# Patient Record
Sex: FEMALE | Race: BLACK OR AFRICAN AMERICAN | NOT HISPANIC OR LATINO | Employment: OTHER | URBAN - METROPOLITAN AREA
[De-identification: names, ages, dates, MRNs, and addresses within clinical notes are randomized per-mention and may not be internally consistent; named-entity substitution may affect disease eponyms.]

---

## 2018-07-15 ENCOUNTER — HOSPITAL ENCOUNTER (EMERGENCY)
Facility: HOSPITAL | Age: 73
Discharge: HOME | End: 2018-07-15
Attending: FAMILY MEDICINE
Payer: MEDICARE

## 2018-07-15 VITALS
DIASTOLIC BLOOD PRESSURE: 62 MMHG | BODY MASS INDEX: 28.32 KG/M2 | WEIGHT: 170 LBS | OXYGEN SATURATION: 92 % | RESPIRATION RATE: 18 BRPM | HEIGHT: 65 IN | SYSTOLIC BLOOD PRESSURE: 114 MMHG | HEART RATE: 68 BPM | TEMPERATURE: 99 F

## 2018-07-15 DIAGNOSIS — G50.0 TRIGEMINAL NEURALGIA OF RIGHT SIDE OF FACE: Primary | ICD-10-CM

## 2018-07-15 PROCEDURE — 99283 EMERGENCY DEPT VISIT LOW MDM: CPT | Mod: ,,, | Performed by: FAMILY MEDICINE

## 2018-07-15 PROCEDURE — 99283 EMERGENCY DEPT VISIT LOW MDM: CPT

## 2018-07-15 RX ORDER — CARBAMAZEPINE 200 MG/1
200 TABLET, EXTENDED RELEASE ORAL 2 TIMES DAILY
Qty: 60 TABLET | Refills: 0 | Status: SHIPPED | OUTPATIENT
Start: 2018-07-15 | End: 2018-08-07 | Stop reason: SDUPTHER

## 2018-07-15 NOTE — DISCHARGE INSTRUCTIONS
You should take the carbamazepine twice daily.  It will take a few weeks to start helping.  It may make you feel sleepy, but this side effect usually gets better as your body adjusts to the medicine.      If the medicine helps control your pain, you must continue to take it or the pain will usually return when you stop it.      You need to follow up with a primary doctor to monitor you on this medicine and make adjustments in the dose to find the level which helps the pain enough without causing too much drowsiness.      We are giving you a one month supply to get started.  You should follow up with a clinic doctor before this runs out in order to decide how much and how long to continue.

## 2018-07-15 NOTE — ED NOTES
Pt arrives for evaluation of right facial pain since September of 2017 - states she went to [facility] ED and was told to follow up with a dentist - states she has not seen a dentist and pain is getting worse - denies trauma - worse when eating or chewing - pain concentrated to right mandible

## 2018-07-15 NOTE — ED PROVIDER NOTES
Encounter Date: 7/15/2018    SCRIBE #1 NOTE: IRoxie am scribing for, and in the presence of,  Dr. Mckee . I have scribed the following portions of the note - Other sections scribed: HPI, ROS, PE.       History     Chief Complaint   Patient presents with    Facial Pain     facial pain on rt side of face from corner of mouth to rt ear - onset September 2017. [facility] but wasn;t able to make appointment.      Time patient was seen by the provider: 9:56 AM      The patient is a 72 Y.O. female with co-morbidities including: HTN who presents to the ED with a complaint of facial pain. The patient reports chronic, intermittent, right-sided, facial pain from the corner of the mouth to her ear. She reports that some days are worse than others in regards to her pain. The patient states the pain has been worsening since 3AM this morning. She reports being seen in the ER and a dentist for sx in the past. Patient states that the dentist numbed her mouth and cleaned her teeth. She denies any bleeding and broken teeth.       The history is provided by the patient and medical records.     Review of patient's allergies indicates:  No Known Allergies  Past Medical History:   Diagnosis Date    Hypertension      Past Surgical History:   Procedure Laterality Date    HYSTERECTOMY  1977     No family history on file.  Social History   Substance Use Topics    Smoking status: Never Smoker    Smokeless tobacco: Not on file    Alcohol use No     Review of Systems   Constitutional: Negative for fever.   HENT: Negative for sore throat.         Right-sided, chronic, intermittent, facial pain from the mouth to the ear.    Respiratory: Negative for shortness of breath.    Cardiovascular: Negative for chest pain.   Gastrointestinal: Negative for nausea.   Genitourinary: Negative for dysuria.   Musculoskeletal: Negative for back pain.   Skin: Negative for rash.   Neurological: Negative for weakness.    Hematological: Does not bruise/bleed easily.       Physical Exam     Initial Vitals [07/15/18 0840]   BP Pulse Resp Temp SpO2   114/62 68 18 98.8 °F (37.1 °C) (!) 92 %      MAP       --         Physical Exam    Nursing note and vitals reviewed.  Constitutional: She appears well-developed and well-nourished. She is not diaphoretic. No distress.   HENT:   Head: Normocephalic and atraumatic.   Pain reproduced by light touch along the right mandible and tapping on the right lower pre moral area. No bleeding or evidence of physical abnormalities.      Eyes: EOM are normal. Pupils are equal, round, and reactive to light.   Neck: Normal range of motion. Neck supple.   Cardiovascular: Normal rate, regular rhythm, normal heart sounds and intact distal pulses. Exam reveals no friction rub.    No murmur heard.  Pulmonary/Chest: Breath sounds normal. No respiratory distress.   Abdominal: Soft. Bowel sounds are normal. She exhibits no distension. There is no tenderness. There is no rebound.   Musculoskeletal: Normal range of motion. She exhibits no edema or tenderness.   Neurological: She is alert and oriented to person, place, and time. She has normal strength. No cranial nerve deficit.   Skin: Skin is warm and dry. No erythema. No pallor.         ED Course   Procedures  Labs Reviewed - No data to display       Imaging Results    None          Medical Decision Making:   ED Management:  Patient had an episode of paresthesia and pain during her physical exam this afternoon.  She has severe lancinating pain for several seconds with no fixed neurologic deficits during or post exam.    After the episode she was essentially asymptomatic again    Her presentation is classic for trigeminal neuralgia.  I do not think she has a fixed dental lesions such as an abscess or dry socket causing this pain given the rapid onset and resolution I observed  Today.    She is reassured that the symptoms are not suggestive of stroke or other fixed  neurologic deficit.  Think it is reasonable to start her on a trial of carbamazepine.  I explained in detail that this needs to be followed up for dosage adjustment and to confirm that she does respond to the medicine.  However I do not think she needs additional emergency diagnostic evaluation or therapeutic intervention today.    Stable for discharge            Scribe Attestation:   Scribe #1: I performed the above scribed service and the documentation accurately describes the services I performed. I attest to the accuracy of the note.               Clinical Impression:   The encounter diagnosis was Trigeminal neuralgia of right side of face.                             David Mckee MD  07/15/18 9420

## 2018-07-26 ENCOUNTER — OFFICE VISIT (OUTPATIENT)
Dept: INTERNAL MEDICINE | Facility: CLINIC | Age: 73
End: 2018-07-26
Payer: MEDICARE

## 2018-07-26 VITALS
BODY MASS INDEX: 28.36 KG/M2 | HEART RATE: 64 BPM | WEIGHT: 170.19 LBS | HEIGHT: 65 IN | SYSTOLIC BLOOD PRESSURE: 141 MMHG | DIASTOLIC BLOOD PRESSURE: 81 MMHG

## 2018-07-26 DIAGNOSIS — Z09 HOSPITAL DISCHARGE FOLLOW-UP: Primary | ICD-10-CM

## 2018-07-26 DIAGNOSIS — G50.0 TRIGEMINAL NEURALGIA OF RIGHT SIDE OF FACE: ICD-10-CM

## 2018-07-26 PROBLEM — E78.5 HLD (HYPERLIPIDEMIA): Status: ACTIVE | Noted: 2018-07-26

## 2018-07-26 PROBLEM — I10 HTN (HYPERTENSION): Status: ACTIVE | Noted: 2018-07-26

## 2018-07-26 PROCEDURE — 99999 PR PBB SHADOW E&M-EST. PATIENT-LVL III: CPT | Mod: PBBFAC,GC,, | Performed by: STUDENT IN AN ORGANIZED HEALTH CARE EDUCATION/TRAINING PROGRAM

## 2018-07-26 PROCEDURE — 99203 OFFICE O/P NEW LOW 30 MIN: CPT | Mod: S$PBB,GC,, | Performed by: STUDENT IN AN ORGANIZED HEALTH CARE EDUCATION/TRAINING PROGRAM

## 2018-07-26 PROCEDURE — 99213 OFFICE O/P EST LOW 20 MIN: CPT | Mod: PBBFAC | Performed by: STUDENT IN AN ORGANIZED HEALTH CARE EDUCATION/TRAINING PROGRAM

## 2018-07-26 NOTE — PROGRESS NOTES
"Subjective:       Patient ID: Kel Cui is a 72 y.o. female.    Chief Complaint: Follow-up    Ms. Kel Cui, 73 yo female w/PMH of HTN, HLD, cataracts, depression, and trigeminal neuralgia, presents for ER f/u. She presented to the ED on 7/15 with a complaint of right sided facial pain. The patient reports chronic, daily, intermittent, right-sided, facial pain from the corner of the mouth to her ear. The pain is associated with right blurry vision and difficulty chewing with pain episodes. Pain is sharp and 10/10 at worst. Pain has been ongoing since summer 2017. She reports that some days are worse than others in regards to her pain. The patient states the pain has been worsening since 3AM 7/15 morning so she went to the ED. She had seen an [facility] PCP last summer and was prescribed carbamazepine 100 mg daily. She tried it for 1 month with no relief and stopped it. She also reports seeing a dentist for sx in the past. Patient states that the dentist numbed her mouth and cleaned her teeth but nothing else. She states she saw another dentist in [place] that told her to see an orthodontist for possible "pulp stone" but she never followed up with that. She denies any bleeding and broken teeth. This past ED visit, she was given carbamazepine 200 mg BID and discharged home to f/u with a PCP. Today, she states the pain is still there and the medicine has not helped. She states she has never seen a neurologist for this. Pt would like to establish care another time with a staff or resident that can see her in the AMs because she as a 10 yo nephew that she has to be home for in the afternoons.      Review of Systems   Constitutional: Negative for chills, diaphoresis, fatigue and fever.   HENT: Negative for congestion, hearing loss, rhinorrhea, sore throat and trouble swallowing.         R sided facial pain, intermittent   Eyes: Positive for visual disturbance (R blurry vision with pain). Negative for pain. " "  Respiratory: Negative for cough and shortness of breath.    Cardiovascular: Negative for chest pain, palpitations and leg swelling.   Gastrointestinal: Negative for abdominal pain, nausea and vomiting.   Genitourinary: Negative for dysuria and hematuria.   Musculoskeletal: Negative for arthralgias and back pain.   Skin: Negative for color change, rash and wound.   Neurological: Negative for dizziness, syncope, speech difficulty, weakness, light-headedness, numbness and headaches.   Hematological: Negative for adenopathy. Does not bruise/bleed easily.   Psychiatric/Behavioral: Negative for agitation and confusion. The patient is not nervous/anxious.        Objective:       BP (!) 141/81 (BP Location: Left arm, Patient Position: Sitting, BP Method: Medium (Automatic))   Pulse 64   Ht 5' 5" (1.651 m)   Wt 77.2 kg (170 lb 3.1 oz)   BMI 28.32 kg/m²     Physical Exam   Constitutional: She is oriented to person, place, and time. She appears well-developed and well-nourished. No distress.   HENT:   Head: Normocephalic and atraumatic.   Right Ear: External ear normal.   Left Ear: External ear normal.   Mouth/Throat: Oropharynx is clear and moist. No oropharyngeal exudate.   Mild amt of cerumen b/l. No sensation loss for face b/l. No TTP. Tapping on face does not re-create pain.   Eyes: Conjunctivae and EOM are normal. Pupils are equal, round, and reactive to light. Right eye exhibits no discharge. Left eye exhibits no discharge. No scleral icterus.   Neck: Normal range of motion. Neck supple. No thyromegaly present.   Cardiovascular: Normal rate, regular rhythm and intact distal pulses.    No murmur heard.  Pulmonary/Chest: Effort normal and breath sounds normal. No respiratory distress. She has no wheezes. She has no rales. She exhibits no tenderness.   Abdominal: Soft. Bowel sounds are normal. She exhibits no mass. There is no tenderness. There is no guarding.   Musculoskeletal: Normal range of motion. She exhibits " "no edema or tenderness.   Neurological: She is alert and oriented to person, place, and time.   Skin: Skin is warm and dry. No rash noted. She is not diaphoretic. No erythema.   Psychiatric: She has a normal mood and affect. Her behavior is normal. Judgment and thought content normal.   Vitals reviewed.      Assessment:       1. Hospital discharge follow-up    2. Trigeminal neuralgia of right side of face        Plan:       Ms. Kel Cui, 71 yo female w/PMH of HTN, HLD, cataracts, depression, and trigeminal neuralgia, presents for ER f/u for right side facial pain that was diagnosed as trigeminal neuralgia.    1. Hospital discharge follow-up  - see HPI, possible 2/2 tooth etiology/"pulp stone" as pt stated and was told to f/u with an orthodontist or oral surgeon.   - Gave names/phone number for 2 oral surgeons in [place]: Dr. Julio Dykes or Dr. Rodolfo Garcia, oral surgeons. May have "pulp stone"  Office number: [phone]    2. Trigeminal neuralgia of right side of face  - Ambulatory Referral to Neurology  - cont carbamazepine 200 mg BID as prescribed by ED until appt with neuro      Discussed with Dr. Razo. Pt to establish care sometime next week or as soon as can be scheduled with an AM resident.    Ailyn Warren, PGY3  Internal Medicine  "

## 2018-07-26 NOTE — PATIENT INSTRUCTIONS
"Dr. Julio Dykes or Dr. Rodolfo Garcia, oral surgeons. May have "pulp stone"  Office number: [phone]  "

## 2018-08-07 ENCOUNTER — OFFICE VISIT (OUTPATIENT)
Dept: INTERNAL MEDICINE | Facility: CLINIC | Age: 73
End: 2018-08-07
Payer: MEDICARE

## 2018-08-07 VITALS
BODY MASS INDEX: 28.1 KG/M2 | SYSTOLIC BLOOD PRESSURE: 142 MMHG | OXYGEN SATURATION: 99 % | WEIGHT: 168.63 LBS | HEART RATE: 74 BPM | DIASTOLIC BLOOD PRESSURE: 74 MMHG | HEIGHT: 65 IN

## 2018-08-07 DIAGNOSIS — G50.0 TRIGEMINAL NEURALGIA OF RIGHT SIDE OF FACE: Primary | ICD-10-CM

## 2018-08-07 PROCEDURE — 99214 OFFICE O/P EST MOD 30 MIN: CPT | Mod: PBBFAC

## 2018-08-07 PROCEDURE — 99999 PR PBB SHADOW E&M-EST. PATIENT-LVL IV: CPT | Mod: PBBFAC,GC,,

## 2018-08-07 PROCEDURE — 99213 OFFICE O/P EST LOW 20 MIN: CPT | Mod: S$PBB,GC,,

## 2018-08-07 RX ORDER — CARBAMAZEPINE 200 MG/1
200 TABLET, EXTENDED RELEASE ORAL 3 TIMES DAILY
Qty: 90 TABLET | Refills: 0 | Status: SHIPPED | OUTPATIENT
Start: 2018-08-07 | End: 2018-08-30

## 2018-08-07 NOTE — PROGRESS NOTES
Subjective:       Patient ID: Kel Cui is a 72 y.o. female.    Chief Complaint: Hospital Follow Up    HPI     Kel Cui is a 72 y.o. lady with trigeminal neuralgia, essential HTN, HLD who presents as a follow up.  She was originally seen in the ED on 7/15 for R sided facial pain that has been chronic for the past year.  She was given a prescription of carbamazepine 200 mg PO BID without significant relief.  She followed up with [facility] for further management, where she was given reference numbers to a dentist.  She states she was unable to call them as they work in [employer] and she lives in [place] and takes public transportation to get here.  She does note however, that her pain has improved not in intensity, but in frequency.  She has noted she had increased her tegretol to 200 mg PO TID.  Otherwise, she has no fevers, chills, or oral issues.  Still requests to see a provider in the AM as she has 10 year old child to care for and  from school.    Based on her previous information, she notes she did have [facility] which [facility] 5 polyps and was informed she needed a 1 year follow up colonoscopy.  She also notes of having a mammogram 1 year ago and was informed of having a follow up.  She does have a family history of cancer of unknown etiology with her mother, who she states passed at the age of 39.  Did have pneumovax after 2010 and had flu vaccine within the past year.  Denies any significant history of colon cancer or early heart disease.  No labs at baseline on our records.  Was previously a housekeepr at a hotel.        Review of Systems   Constitutional: Negative for chills, fatigue and fever.   HENT: Negative for dental problem, drooling, ear pain, facial swelling, hearing loss, mouth sores, postnasal drip and rhinorrhea.    Eyes: Negative for photophobia and visual disturbance.   Respiratory: Negative for cough, shortness of breath and wheezing.   "  Cardiovascular: Negative for chest pain, palpitations and leg swelling.   Gastrointestinal: Negative for abdominal distention, abdominal pain, constipation, diarrhea, nausea and vomiting.   Endocrine: Negative for polydipsia and polyphagia.   Genitourinary: Negative for dysuria and frequency.   Musculoskeletal: Negative for arthralgias, back pain and myalgias.   Skin: Negative for pallor and rash.   Neurological: Negative for tremors, weakness, light-headedness and headaches.   Psychiatric/Behavioral: Negative for behavioral problems and confusion.       Objective:       Vitals:    08/07/18 1410   BP: (!) 142/74   BP Location: Right arm   Patient Position: Sitting   BP Method: Large (Manual)   Pulse: 74   SpO2: 99%   Weight: 76.5 kg (168 lb 10.4 oz)   Height: 5' 5" (1.651 m)       Physical Exam   Constitutional: She is oriented to person, place, and time. She appears well-developed and well-nourished. No distress.   HENT:   Head: Normocephalic and atraumatic.   Right Ear: External ear normal.   Left Ear: External ear normal.   Mouth/Throat: No oropharyngeal exudate.   Point tenderness palpated on R side of chin, no other obvious pain along mandible.  Overall, noted 4 episodes where patient noted pain, of which lasted approximately 4-5 seconds each.  No oral lesions noted.  No abscess noted.  No cervical adenopathy.     Neck: Normal range of motion. No thyromegaly present.   Cardiovascular: Normal rate, regular rhythm and normal heart sounds.    No murmur heard.  Pulmonary/Chest: Effort normal and breath sounds normal. No respiratory distress. She has no wheezes.   Abdominal: Soft. Bowel sounds are normal. She exhibits no distension. There is no tenderness.   Musculoskeletal: Normal range of motion. She exhibits no edema.   Lymphadenopathy:     She has no cervical adenopathy.   Neurological: She is alert and oriented to person, place, and time. No cranial nerve deficit.   Skin: Skin is warm and dry. She is not " diaphoretic. No erythema. No pallor.   Psychiatric: She has a normal mood and affect. Her behavior is normal.   Vitals reviewed.      Assessment:       1. Trigeminal neuralgia of right side of face        Plan:       Kel was seen today for hospital follow up.    Diagnoses and all orders for this visit:    Trigeminal neuralgia of right side of face  -     carBAMazepine (TEGRETOL XR) 200 MG 12 hr tablet; Take 1 tablet (200 mg total) by mouth 3 (three) times daily.        - Patient meets International Classification of Headache Disorders, Third Edition (ICHD-3) criteria for TN with noted improvement with tegretol.  Patient taking increased dose of tegretol at 200 mg PO TID.  Will continue at this time with max dose at 800 mg PO throughout day.  Informed patient to follow up with [facility] for follow up as previous numbers were far from her location.  Given a 1 month supply, patient has follow up with neurologist at 8/30.  Established patient with follow up with PCP in AM on 8/14/2018 with Dr. Mae.  Patient would likely need baseline labs (CBC, CMP, lipid panel) along with repeat colonoscopy/mammogram, as well as information about vaccinations.    RTC to establish care as noted above.    Discussed with Staff, Dr. Venegas.    Leobardo Galaviz MD  Internal Medicine PGY-3  [phone]

## 2018-08-07 NOTE — PATIENT INSTRUCTIONS
Please follow up with PCP at 8/14/2018 with Dr. Mae at 0915 AM    Carbemazepine sent to pharmacy    Follow up with neurology

## 2018-08-09 NOTE — PROGRESS NOTES
I have reviewed the notes, assessments, and/or procedures performed by Dr. Galaviz, I concur with his documentation of Kel Cui.

## 2018-08-20 ENCOUNTER — OFFICE VISIT (OUTPATIENT)
Dept: INTERNAL MEDICINE | Facility: CLINIC | Age: 73
End: 2018-08-20
Payer: MEDICARE

## 2018-08-20 ENCOUNTER — LAB VISIT (OUTPATIENT)
Dept: LAB | Facility: HOSPITAL | Age: 73
End: 2018-08-20
Attending: STUDENT IN AN ORGANIZED HEALTH CARE EDUCATION/TRAINING PROGRAM
Payer: MEDICARE

## 2018-08-20 VITALS
HEART RATE: 57 BPM | DIASTOLIC BLOOD PRESSURE: 80 MMHG | OXYGEN SATURATION: 98 % | WEIGHT: 170.88 LBS | HEIGHT: 65 IN | SYSTOLIC BLOOD PRESSURE: 128 MMHG | BODY MASS INDEX: 28.47 KG/M2

## 2018-08-20 DIAGNOSIS — H54.7 VISION IMPAIRMENT: ICD-10-CM

## 2018-08-20 DIAGNOSIS — Z91.89 AT RISK FOR OSTEOPOROSIS: ICD-10-CM

## 2018-08-20 DIAGNOSIS — Z00.00 HEALTH CARE MAINTENANCE: Primary | ICD-10-CM

## 2018-08-20 DIAGNOSIS — R79.9 ABNORMAL FINDING OF BLOOD CHEMISTRY: ICD-10-CM

## 2018-08-20 DIAGNOSIS — Z00.00 HEALTH CARE MAINTENANCE: ICD-10-CM

## 2018-08-20 DIAGNOSIS — Z86.32 HISTORY OF GESTATIONAL DIABETES: ICD-10-CM

## 2018-08-20 DIAGNOSIS — Z87.310 PERSONAL HISTORY OF HEALED OSTEOPOROSIS FRACTURE: ICD-10-CM

## 2018-08-20 DIAGNOSIS — Z00.00 ENCOUNTER FOR MEDICAL EXAMINATION TO ESTABLISH CARE: ICD-10-CM

## 2018-08-20 DIAGNOSIS — R92.8 ABNORMAL MAMMOGRAM: ICD-10-CM

## 2018-08-20 DIAGNOSIS — Z83.3 FAMILY HISTORY OF DIABETES MELLITUS: ICD-10-CM

## 2018-08-20 DIAGNOSIS — Z12.31 ENCOUNTER FOR SCREENING MAMMOGRAM FOR MALIGNANT NEOPLASM OF BREAST: ICD-10-CM

## 2018-08-20 DIAGNOSIS — Z86.010 HISTORY OF COLON POLYPS: ICD-10-CM

## 2018-08-20 DIAGNOSIS — Z13.820 OSTEOPOROSIS SCREENING: ICD-10-CM

## 2018-08-20 DIAGNOSIS — Z12.9 CANCER SCREENING: ICD-10-CM

## 2018-08-20 LAB
ALBUMIN SERPL BCP-MCNC: 4 G/DL
ALP SERPL-CCNC: 104 U/L
ALT SERPL W/O P-5'-P-CCNC: 23 U/L
ANION GAP SERPL CALC-SCNC: 9 MMOL/L
AST SERPL-CCNC: 23 U/L
BASOPHILS # BLD AUTO: 0.03 K/UL
BASOPHILS NFR BLD: 0.4 %
BILIRUB SERPL-MCNC: 0.4 MG/DL
BUN SERPL-MCNC: 16 MG/DL
CALCIUM SERPL-MCNC: 10.1 MG/DL
CHLORIDE SERPL-SCNC: 105 MMOL/L
CHOLEST SERPL-MCNC: 262 MG/DL
CHOLEST/HDLC SERPL: 4.2 {RATIO}
CO2 SERPL-SCNC: 28 MMOL/L
CREAT SERPL-MCNC: 0.9 MG/DL
DIFFERENTIAL METHOD: ABNORMAL
EOSINOPHIL # BLD AUTO: 0.2 K/UL
EOSINOPHIL NFR BLD: 2.5 %
ERYTHROCYTE [DISTWIDTH] IN BLOOD BY AUTOMATED COUNT: 13.3 %
EST. GFR  (AFRICAN AMERICAN): >60 ML/MIN/1.73 M^2
EST. GFR  (NON AFRICAN AMERICAN): >60 ML/MIN/1.73 M^2
ESTIMATED AVG GLUCOSE: 103 MG/DL
GLUCOSE SERPL-MCNC: 95 MG/DL
HBA1C MFR BLD HPLC: 5.2 %
HCT VFR BLD AUTO: 40.6 %
HDLC SERPL-MCNC: 62 MG/DL
HDLC SERPL: 23.7 %
HGB BLD-MCNC: 13.2 G/DL
LDLC SERPL CALC-MCNC: 165.2 MG/DL
LYMPHOCYTES # BLD AUTO: 2.4 K/UL
LYMPHOCYTES NFR BLD: 34.2 %
MCH RBC QN AUTO: 32.2 PG
MCHC RBC AUTO-ENTMCNC: 32.5 G/DL
MCV RBC AUTO: 99 FL
MONOCYTES # BLD AUTO: 0.5 K/UL
MONOCYTES NFR BLD: 7.2 %
NEUTROPHILS # BLD AUTO: 3.8 K/UL
NEUTROPHILS NFR BLD: 55.6 %
NONHDLC SERPL-MCNC: 200 MG/DL
PLATELET # BLD AUTO: 230 K/UL
PMV BLD AUTO: 9.2 FL
POTASSIUM SERPL-SCNC: 4.2 MMOL/L
PROT SERPL-MCNC: 8 G/DL
RBC # BLD AUTO: 4.1 M/UL
SODIUM SERPL-SCNC: 142 MMOL/L
TRIGL SERPL-MCNC: 174 MG/DL
WBC # BLD AUTO: 6.91 K/UL

## 2018-08-20 PROCEDURE — 99999 PR PBB SHADOW E&M-EST. PATIENT-LVL V: CPT | Mod: PBBFAC,GC,, | Performed by: STUDENT IN AN ORGANIZED HEALTH CARE EDUCATION/TRAINING PROGRAM

## 2018-08-20 PROCEDURE — 80061 LIPID PANEL: CPT

## 2018-08-20 PROCEDURE — 36415 COLL VENOUS BLD VENIPUNCTURE: CPT

## 2018-08-20 PROCEDURE — 83036 HEMOGLOBIN GLYCOSYLATED A1C: CPT

## 2018-08-20 PROCEDURE — 99213 OFFICE O/P EST LOW 20 MIN: CPT | Mod: S$PBB,GC,, | Performed by: STUDENT IN AN ORGANIZED HEALTH CARE EDUCATION/TRAINING PROGRAM

## 2018-08-20 PROCEDURE — 80053 COMPREHEN METABOLIC PANEL: CPT

## 2018-08-20 PROCEDURE — 85025 COMPLETE CBC W/AUTO DIFF WBC: CPT

## 2018-08-20 PROCEDURE — 99215 OFFICE O/P EST HI 40 MIN: CPT | Mod: PBBFAC | Performed by: STUDENT IN AN ORGANIZED HEALTH CARE EDUCATION/TRAINING PROGRAM

## 2018-08-20 RX ORDER — ACETAMINOPHEN 500 MG
TABLET ORAL
Refills: 0 | COMMUNITY
Start: 2018-08-20

## 2018-08-20 NOTE — PROGRESS NOTES
"Subjective:       Patient ID: Kel Cui is a 72 y.o. female.    Chief Complaint: Establish Care    HPI   Ms. Kel Cui, 73 yo female w/PMH of HTN, HLD, cataracts, and trigeminal neuralgia, presents to establish care.     Trigeminal Neuralgia  Reports continued daily, intermittent, right-sided, facial pain from the corner of the mouth to her ear. The pain is associated with right blurry vision and difficulty chewing. Pain is sharp and 10/10 at worst. She reports that some days are worse than others in regards to her pain. On worse days, Pt takes carbamazepine TID, but more tolerable days BID. With carbamazepine, pain de-escalates from 10/10 to about 7-8/10. She does not think that acute changes are required for pain management before neurology appt in 10 days.     HTN  Does not monitor at home. However, compliant with medication.     HLD  Compliant with medication. Purports balanced diet with meats, vegetables, and fruits. When asked about exercise, responds that "walks everywhere."    Review of Systems   Constitutional: Negative for activity change, appetite change, chills, fever and unexpected weight change.   HENT: Negative for ear pain, hearing loss, rhinorrhea, sneezing, sore throat, tinnitus and trouble swallowing.    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   Respiratory: Negative for cough and shortness of breath.    Cardiovascular: Negative for chest pain, palpitations and leg swelling.   Gastrointestinal: Negative for abdominal pain, blood in stool, constipation, diarrhea, nausea and vomiting.   Endocrine: Negative for polydipsia and polyuria.   Genitourinary: Negative for dysuria and hematuria.   Musculoskeletal: Negative for arthralgias, gait problem and joint swelling.   Skin: Negative for rash.   Neurological: Negative for weakness, light-headedness, numbness and headaches.   Hematological: Negative for adenopathy.    "      Past Medical History:   Diagnosis Date    Hypertension     Trigeminal neuralgia of right side of face        Past Surgical History:   Procedure Laterality Date    CATARACT EXTRACTION W/  INTRAOCULAR LENS IMPLANT Right 2015    HYSTERECTOMY  1977     Current Outpatient Medications on File Prior to Visit   Medication Sig Dispense Refill    aspirin (ECOTRIN) 81 MG EC tablet Take 81 mg by mouth once daily.      calcium-vitamin D3 500 mg(1,250mg) -200 unit per tablet Take 1 tablet by mouth 2 (two) times daily with meals.      carBAMazepine (TEGRETOL XR) 200 MG 12 hr tablet Take 1 tablet (200 mg total) by mouth 3 (three) times daily. 90 tablet 0    hydrochlorothiazide (HYDRODIURIL) 25 MG tablet Take 25 mg by mouth once daily.      pravastatin (PRAVACHOL) 40 MG tablet Take 40 mg by mouth every evening.       No current facility-administered medications on file prior to visit.      Social History     Tobacco Use    Smoking status: Never Smoker    Smokeless tobacco: Never Used   Substance Use Topics    Alcohol use: No    Drug use: No     Family History   Problem Relation Age of Onset    Cancer Mother         39    Cancer Sister         Pancreatic    Cancer Brother         Prostate    Diabetes Maternal Aunt     Diabetes Maternal Uncle     Diabetes Paternal Aunt     Diabetes Paternal Uncle        Objective:      Physical Exam   Constitutional: She is oriented to person, place, and time. She appears well-developed and well-nourished. No distress.   HENT:   Head: Normocephalic and atraumatic.   Right Ear: External ear normal.   Left Ear: External ear normal.   Nose: Nose normal.   Mouth/Throat: Oropharynx is clear and moist. No oropharyngeal exudate.   Eyes: Conjunctivae and EOM are normal. Pupils are equal, round, and reactive to light.   Neck: Normal range of motion. No JVD present. No thyromegaly present.   Cardiovascular: Normal rate, regular rhythm, normal heart sounds and intact distal pulses. Exam  reveals no gallop and no friction rub.   No murmur heard.  Pulmonary/Chest: Effort normal and breath sounds normal. She exhibits no tenderness.   Abdominal: Soft. Bowel sounds are normal. She exhibits no mass. There is no tenderness. There is no guarding.   Musculoskeletal: She exhibits no edema, tenderness or deformity.   Lymphadenopathy:     She has no cervical adenopathy.   Neurological: She is alert and oriented to person, place, and time.   Skin: Skin is warm and dry. No rash noted. She is not diaphoretic. No erythema.         Assessment:       1. Health care maintenance    2. Encounter for medical examination to establish care    3. Cancer screening    4. Osteoporosis screening    5. Vision impairment    6. Orthodontics    7. Abnormal mammogram    8. History of colon polyps    9. At risk for osteoporosis    10. Family history of diabetes mellitus    11. History of gestational diabetes     12. Abnormal finding of blood chemistry     13. Encounter for screening mammogram for malignant neoplasm of breast     14. Personal history of healed osteoporosis fracture         Plan:       Kel was seen today for establish care.    Diagnoses and all orders for this visit:    Trigeminal Neuralgia   -     Continue Tegretol XR 200mg 12hr tablet TID  -     Neurology appt 8/30/18 w Dr. Dennison    HTN   -     Continue HCTZ 25mg tablet PO Daily  -     Ordered blood pressure test kit-large Kit for home monitoring    HLD   -     Continue Pravastatin 40mg tablet nightly   -     Lipid panel; future     Health care maintenance  -     Case request GI: COLONOSCOPY  -     Ambulatory Referral to Ophthalmology: Pt reports being followed at [facility] for retinal checks,  though unsure why. Obtaining records.   -     Hemoglobin A1c; Future  -     Lipid panel; Future  -     CBC auto differential; Future  -     Comprehensive metabolic panel; Future  -     Zoster Vaccine - Live and Tdap: written script to f/u at Pt's [facility] pharmacy  -     Mammo  Digital Screening Bilateral With CAD; Future  -     DXA Bone Density Spine And Hip; Future  -     blood pressure test kit-large Kit; Health maintenance    Encounter for medical examination to establish care  -     Hemoglobin A1c; Future  -     Lipid panel; Future  -     CBC auto differential; Future  -     Comprehensive metabolic panel; Future  -     Mammo Digital Screening Bilateral With CAD; Future  -     DXA Bone Density Spine And Hip; Future  -     blood pressure test kit-large Kit; Health maintenance    Cancer screening  -     Case request GI: COLONOSCOPY    Osteoporosis screening    Vision impairment  -     Ambulatory Referral to Ophthalmology    Orthodontics  -     Ambulatory consult to Orthodontics    Abnormal mammogram  -     Mammo Digital Screening Bilateral With CAD; Future    History of colon polyps  -     Case request GI: COLONOSCOPY    At risk for osteoporosis  -     DXA Bone Density Spine And Hip; Future    Family history of diabetes mellitus  -     Hemoglobin A1c; Future    Abnormal finding of blood chemistry   -     Lipid panel; Future    Encounter for screening mammogram for malignant neoplasm of breast   -     Mammo Digital Screening Bilateral With CAD; Future

## 2018-08-20 NOTE — PATIENT INSTRUCTIONS
"Please call Dr. Julio Dykes or Dr. Rodolfo Garcia to make an appointment. They are oral surgeons. May have "pulp stone"  Office number: [phone]      "

## 2018-08-23 DIAGNOSIS — Z12.11 SPECIAL SCREENING FOR MALIGNANT NEOPLASMS, COLON: Primary | ICD-10-CM

## 2018-08-23 RX ORDER — POLYETHYLENE GLYCOL 3350, SODIUM SULFATE ANHYDROUS, SODIUM BICARBONATE, SODIUM CHLORIDE, POTASSIUM CHLORIDE 236; 22.74; 6.74; 5.86; 2.97 G/4L; G/4L; G/4L; G/4L; G/4L
4 POWDER, FOR SOLUTION ORAL ONCE
Qty: 4000 ML | Refills: 0 | Status: SHIPPED | OUTPATIENT
Start: 2018-08-23 | End: 2018-09-12

## 2018-08-27 ENCOUNTER — HOSPITAL ENCOUNTER (OUTPATIENT)
Dept: RADIOLOGY | Facility: HOSPITAL | Age: 73
Discharge: HOME | End: 2018-08-27
Attending: STUDENT IN AN ORGANIZED HEALTH CARE EDUCATION/TRAINING PROGRAM
Payer: MEDICARE

## 2018-08-27 DIAGNOSIS — Z12.31 ENCOUNTER FOR SCREENING MAMMOGRAM FOR MALIGNANT NEOPLASM OF BREAST: ICD-10-CM

## 2018-08-27 DIAGNOSIS — Z00.00 ENCOUNTER FOR MEDICAL EXAMINATION TO ESTABLISH CARE: ICD-10-CM

## 2018-08-27 DIAGNOSIS — R92.8 ABNORMAL MAMMOGRAM: ICD-10-CM

## 2018-08-27 DIAGNOSIS — Z00.00 HEALTH CARE MAINTENANCE: ICD-10-CM

## 2018-08-27 PROCEDURE — 77067 SCR MAMMO BI INCL CAD: CPT | Mod: TC

## 2018-08-27 PROCEDURE — 77067 SCR MAMMO BI INCL CAD: CPT | Mod: 26,,, | Performed by: RADIOLOGY

## 2018-08-30 ENCOUNTER — OFFICE VISIT (OUTPATIENT)
Dept: NEUROLOGY | Facility: CLINIC | Age: 73
End: 2018-08-30
Payer: MEDICARE

## 2018-08-30 VITALS
HEART RATE: 52 BPM | BODY MASS INDEX: 27.73 KG/M2 | DIASTOLIC BLOOD PRESSURE: 67 MMHG | SYSTOLIC BLOOD PRESSURE: 123 MMHG | HEIGHT: 65 IN | WEIGHT: 166.44 LBS

## 2018-08-30 DIAGNOSIS — E78.5 HYPERLIPIDEMIA, UNSPECIFIED HYPERLIPIDEMIA TYPE: ICD-10-CM

## 2018-08-30 DIAGNOSIS — G50.0 TRIGEMINAL NEURALGIA OF RIGHT SIDE OF FACE: Primary | ICD-10-CM

## 2018-08-30 DIAGNOSIS — G50.9 DISORDER OF TRIGEMINAL NERVE: ICD-10-CM

## 2018-08-30 DIAGNOSIS — I10 ESSENTIAL HYPERTENSION: ICD-10-CM

## 2018-08-30 PROCEDURE — 99204 OFFICE O/P NEW MOD 45 MIN: CPT | Mod: S$PBB,,, | Performed by: PSYCHIATRY & NEUROLOGY

## 2018-08-30 PROCEDURE — 99999 PR PBB SHADOW E&M-EST. PATIENT-LVL III: CPT | Mod: PBBFAC,,, | Performed by: PSYCHIATRY & NEUROLOGY

## 2018-08-30 PROCEDURE — 99213 OFFICE O/P EST LOW 20 MIN: CPT | Mod: PBBFAC | Performed by: PSYCHIATRY & NEUROLOGY

## 2018-08-30 RX ORDER — OXCARBAZEPINE 300 MG/1
300 TABLET, FILM COATED ORAL 2 TIMES DAILY
Qty: 60 TABLET | Refills: 1 | Status: SHIPPED | OUTPATIENT
Start: 2018-08-30 | End: 2018-09-27 | Stop reason: SDUPTHER

## 2018-08-30 NOTE — PATIENT INSTRUCTIONS
Discussed with patient. She had been started on Tegretol at the emergency room however did not get refilled as she ran out of medication.  She reports that it may have helped a little.  Will discontinue the Tegretol because of side effects and need for monitoring blood levels.  Instead will prescribe Trileptal 300 mg 2 times a day.  Reviewed recent blood work done.  In addition will get an MRI scan of the brain, noncontrast.  The patient will be seen by me in follow-up in 1 month.

## 2018-08-30 NOTE — PROGRESS NOTES
Subjective:       Patient ID: Kel Cui is a 72 y.o. female.    Chief Complaint:  Facial Pain      History of Present Illness  HPI   This is a 72-year-old female was referred for evaluation of right facial pain occurring intermittently with onset in mid 2017.  She was subsequently seen [facility] emergency room in September 2017 and was advised follow-up with her dentist.  No testing was done.  However she was able to get dental at an issue and is now looking to see a dentist around here.  She has been seen at the emergency room a couple of occasions and was last seen in July 2018.  The facial pain and limited to lower face below the are on the right and is intermittent but over the past several months has been present daily with fluctuating intensity.  She denies any triggers does sinus problems which she uses Flonase needed.  She denies any hearing.  The pain is described as sharp shooting with occasional tingling.  Triggers include chewing on that side.  She otherwise has problem with speech or swallowing. 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       Review of Systems  Review of Systems   Constitutional: Negative.    HENT: Positive for sinus pressure. Negative for hearing loss.    Eyes: Positive for visual disturbance (Blind in the left eye since childhood).   Respiratory: Negative.  Negative for shortness of breath.    Cardiovascular: Negative.  Negative for chest pain and palpitations.   Gastrointestinal: Negative.    Genitourinary: Negative.    Musculoskeletal: Negative.  Negative for back pain, gait problem and neck pain.   Skin: Negative.    Neurological: Negative.  Negative for tremors, seizures, syncope, speech difficulty, weakness, numbness and headaches.        Right-sided facial pain   Psychiatric/Behavioral: Negative.  Negative for confusion and decreased concentration.       Objective:      Neurologic Exam     Mental Status   Oriented to person, place, and time.   Registration: recalls 3 of 3  objects. Follows 3 step commands.   Attention: normal. Concentration: normal.   Speech: speech is normal   Level of consciousness: alert  Knowledge: good.   Able to name object. Able to read. Able to repeat. Able to write. Normal comprehension.     Cranial Nerves   Cranial nerves II through XII intact.     CN V   Right facial sensation deficit: none (No hypersensitivity noted)  Left facial sensation deficit: none  Slight differences noted in the palpebral fissures however it is to be noted the patient had been blind in the left eye since childhood and reports that the left upper eyelid has always been a little droopy.     Motor Exam   Muscle bulk: normal  Overall muscle tone: normal    Strength   Strength 5/5 throughout.     Sensory Exam   Light touch normal.   Proprioception normal.   Pinprick normal.     Gait, Coordination, and Reflexes     Gait  Gait: normal    Coordination   Romberg: negative  Finger to nose coordination: normal    Tremor   Resting tremor: absent  Intention tremor: absent  Action tremor: absent    Reflexes   Right brachioradialis: 1+  Left brachioradialis: 1+  Right biceps: 1+  Left biceps: 1+  Right triceps: 1+  Left triceps: 1+  Right patellar: 1+  Left patellar: 1+  Right achilles: 1+  Left achilles: 1+  Right plantar: normal  Left plantar: normal      Physical Exam   Constitutional: She is oriented to person, place, and time. She appears well-developed and well-nourished.   HENT:   Head: Normocephalic and atraumatic.   Neck: Normal range of motion. Neck supple. Carotid bruit is not present.   Neurological: She is oriented to person, place, and time. She has normal strength. She has a normal Finger-Nose-Finger Test and a normal Romberg Test. Gait normal.   Reflex Scores:       Tricep reflexes are 1+ on the right side and 1+ on the left side.       Bicep reflexes are 1+ on the right side and 1+ on the left side.       Brachioradialis reflexes are 1+ on the right side and 1+ on the left side.        Patellar reflexes are 1+ on the right side and 1+ on the left side.       Achilles reflexes are 1+ on the right side and 1+ on the left side.  Psychiatric: Her speech is normal.   Vitals reviewed.        Assessment:        1. Trigeminal neuralgia of right side of face    2. Essential hypertension    3. Hyperlipidemia, unspecified hyperlipidemia type    4. Disorder of trigeminal nerve            Plan:       Discussed with patient. She had been started on Tegretol at the emergency room however did not get refilled as she ran out of medication.  She reports that it may have helped a little.  Will discontinue the Tegretol because of side effects and need for monitoring blood levels.  Instead will prescribe Trileptal 300 mg 2 times a day.  Reviewed recent blood work done.  In addition will get an MRI scan of the brain, noncontrast.  The patient will be seen by me in follow-up in 1 month.

## 2018-08-31 ENCOUNTER — HOSPITAL ENCOUNTER (OUTPATIENT)
Dept: RADIOLOGY | Facility: OTHER | Age: 73
Discharge: HOME | End: 2018-08-31
Attending: PSYCHIATRY & NEUROLOGY
Payer: MEDICARE

## 2018-08-31 DIAGNOSIS — G50.9 DISORDER OF TRIGEMINAL NERVE: ICD-10-CM

## 2018-08-31 PROCEDURE — A9585 GADOBUTROL INJECTION: HCPCS | Performed by: PSYCHIATRY & NEUROLOGY

## 2018-08-31 PROCEDURE — 25500020 PHARM REV CODE 255: Performed by: PSYCHIATRY & NEUROLOGY

## 2018-08-31 PROCEDURE — 70553 MRI BRAIN STEM W/O & W/DYE: CPT | Mod: 26,,, | Performed by: RADIOLOGY

## 2018-08-31 PROCEDURE — 70553 MRI BRAIN STEM W/O & W/DYE: CPT | Mod: TC

## 2018-08-31 RX ORDER — GADOBUTROL 604.72 MG/ML
7 INJECTION INTRAVENOUS
Status: COMPLETED | OUTPATIENT
Start: 2018-08-31 | End: 2018-08-31

## 2018-08-31 RX ADMIN — GADOBUTROL 7 ML: 604.72 INJECTION INTRAVENOUS at 01:08

## 2018-09-10 ENCOUNTER — OFFICE VISIT (OUTPATIENT)
Dept: OPTOMETRY | Facility: CLINIC | Age: 73
End: 2018-09-10
Payer: MEDICARE

## 2018-09-10 DIAGNOSIS — H52.201 MYOPIA WITH ASTIGMATISM, RIGHT: ICD-10-CM

## 2018-09-10 DIAGNOSIS — Z96.1 PSEUDOPHAKIA OF BOTH EYES: ICD-10-CM

## 2018-09-10 DIAGNOSIS — H52.11 MYOPIA WITH ASTIGMATISM, RIGHT: ICD-10-CM

## 2018-09-10 DIAGNOSIS — Z13.5 GLAUCOMA SCREENING: ICD-10-CM

## 2018-09-10 DIAGNOSIS — H53.002 AMBLYOPIA, LEFT: ICD-10-CM

## 2018-09-10 DIAGNOSIS — I10 ESSENTIAL HYPERTENSION: Primary | ICD-10-CM

## 2018-09-10 PROCEDURE — 99999 PR PBB SHADOW E&M-EST. PATIENT-LVL II: CPT | Mod: PBBFAC,,, | Performed by: OPTOMETRIST

## 2018-09-10 PROCEDURE — 92015 DETERMINE REFRACTIVE STATE: CPT | Mod: ,,, | Performed by: OPTOMETRIST

## 2018-09-10 PROCEDURE — 99212 OFFICE O/P EST SF 10 MIN: CPT | Mod: PBBFAC | Performed by: OPTOMETRIST

## 2018-09-10 PROCEDURE — 92004 COMPRE OPH EXAM NEW PT 1/>: CPT | Mod: S$PBB,,, | Performed by: OPTOMETRIST

## 2018-09-10 NOTE — PROGRESS NOTES
HPI     Pt here for annual HTN exam   JOSE E 7 yrs ago    -no noticeable vision changes  +os red prn  -no drops at this time  -no flashes or floaters      Last edited by Joseph Nichols, OD on 9/10/2018  9:50 AM. (History)            Assessment /Plan     For exam results, see Encounter Report.    Essential hypertension  -No retinopathy noted today.  Continued control with primary care physician and annual comprehensive eye exam.    Glaucoma screening  -Monitor with annual eye exam and IOP check    Amblyopia, left  -Pt reports poor VA since birth OS    Pseudophakia of both eyes  -clear, centered    Myopia with astigmatism, right  Eyeglass Final Rx     Eyeglass Final Rx       Sphere Cylinder Axis Dist VA Add    Right -1.50 +0.50 035 20/25 +2.50    Left Balance        Expiration Date:  9/11/2019    Comments:  Polycarbonate                  RTC 1 yr

## 2018-09-13 ENCOUNTER — HOSPITAL ENCOUNTER (OUTPATIENT)
Dept: RADIOLOGY | Facility: CLINIC | Age: 73
Discharge: HOME | End: 2018-09-13
Attending: STUDENT IN AN ORGANIZED HEALTH CARE EDUCATION/TRAINING PROGRAM
Payer: MEDICARE

## 2018-09-13 DIAGNOSIS — Z00.00 HEALTH CARE MAINTENANCE: ICD-10-CM

## 2018-09-13 DIAGNOSIS — Z91.89 AT RISK FOR OSTEOPOROSIS: ICD-10-CM

## 2018-09-13 DIAGNOSIS — Z00.00 ENCOUNTER FOR MEDICAL EXAMINATION TO ESTABLISH CARE: ICD-10-CM

## 2018-09-13 DIAGNOSIS — Z87.310 PERSONAL HISTORY OF HEALED OSTEOPOROSIS FRACTURE: ICD-10-CM

## 2018-09-13 PROCEDURE — 77080 DXA BONE DENSITY AXIAL: CPT | Mod: TC

## 2018-09-13 PROCEDURE — 77080 DXA BONE DENSITY AXIAL: CPT | Mod: 26,,, | Performed by: INTERNAL MEDICINE

## 2018-09-18 ENCOUNTER — ANESTHESIA (OUTPATIENT)
Dept: ENDOSCOPY | Facility: HOSPITAL | Age: 73
End: 2018-09-18
Payer: MEDICARE

## 2018-09-18 ENCOUNTER — HOSPITAL ENCOUNTER (OUTPATIENT)
Facility: HOSPITAL | Age: 73
Discharge: HOME | End: 2018-09-18
Attending: COLON & RECTAL SURGERY | Admitting: COLON & RECTAL SURGERY
Payer: MEDICARE

## 2018-09-18 ENCOUNTER — ANESTHESIA EVENT (OUTPATIENT)
Dept: ENDOSCOPY | Facility: HOSPITAL | Age: 73
End: 2018-09-18
Payer: MEDICARE

## 2018-09-18 VITALS
RESPIRATION RATE: 16 BRPM | DIASTOLIC BLOOD PRESSURE: 72 MMHG | TEMPERATURE: 97 F | HEIGHT: 65 IN | SYSTOLIC BLOOD PRESSURE: 164 MMHG | HEART RATE: 53 BPM | OXYGEN SATURATION: 98 % | WEIGHT: 168 LBS | BODY MASS INDEX: 27.99 KG/M2

## 2018-09-18 DIAGNOSIS — Z12.11 SCREENING FOR COLON CANCER: ICD-10-CM

## 2018-09-18 PROCEDURE — 63600175 PHARM REV CODE 636 W HCPCS: Performed by: NURSE ANESTHETIST, CERTIFIED REGISTERED

## 2018-09-18 PROCEDURE — 37000008 HC ANESTHESIA 1ST 15 MINUTES: Performed by: COLON & RECTAL SURGERY

## 2018-09-18 PROCEDURE — 25000003 PHARM REV CODE 250: Performed by: NURSE PRACTITIONER

## 2018-09-18 PROCEDURE — 88305 TISSUE EXAM BY PATHOLOGIST: CPT | Performed by: PATHOLOGY

## 2018-09-18 PROCEDURE — 37000009 HC ANESTHESIA EA ADD 15 MINS: Performed by: COLON & RECTAL SURGERY

## 2018-09-18 PROCEDURE — 25000003 PHARM REV CODE 250: Performed by: NURSE ANESTHETIST, CERTIFIED REGISTERED

## 2018-09-18 PROCEDURE — 27201089 HC SNARE, DISP (ANY): Performed by: COLON & RECTAL SURGERY

## 2018-09-18 PROCEDURE — 45385 COLONOSCOPY W/LESION REMOVAL: CPT | Performed by: COLON & RECTAL SURGERY

## 2018-09-18 PROCEDURE — E9220 PRA ENDO ANESTHESIA: HCPCS | Mod: PT,,, | Performed by: NURSE ANESTHETIST, CERTIFIED REGISTERED

## 2018-09-18 PROCEDURE — 45385 COLONOSCOPY W/LESION REMOVAL: CPT | Mod: PT,,, | Performed by: COLON & RECTAL SURGERY

## 2018-09-18 PROCEDURE — 88305 TISSUE EXAM BY PATHOLOGIST: CPT | Mod: 26,,, | Performed by: PATHOLOGY

## 2018-09-18 RX ORDER — PROPOFOL 10 MG/ML
INJECTION, EMULSION INTRAVENOUS CONTINUOUS PRN
Status: DISCONTINUED | OUTPATIENT
Start: 2018-09-18 | End: 2018-09-18

## 2018-09-18 RX ORDER — PROPOFOL 10 MG/ML
INJECTION, EMULSION INTRAVENOUS
Status: DISCONTINUED | OUTPATIENT
Start: 2018-09-18 | End: 2018-09-18

## 2018-09-18 RX ORDER — SODIUM CHLORIDE 9 MG/ML
INJECTION, SOLUTION INTRAVENOUS CONTINUOUS
Status: DISCONTINUED | OUTPATIENT
Start: 2018-09-18 | End: 2018-09-18 | Stop reason: HOSPADM

## 2018-09-18 RX ORDER — SODIUM CHLORIDE 0.9 % (FLUSH) 0.9 %
3 SYRINGE (ML) INJECTION
Status: DISCONTINUED | OUTPATIENT
Start: 2018-09-18 | End: 2018-09-18 | Stop reason: HOSPADM

## 2018-09-18 RX ORDER — GLYCOPYRROLATE 0.2 MG/ML
INJECTION INTRAMUSCULAR; INTRAVENOUS
Status: DISCONTINUED | OUTPATIENT
Start: 2018-09-18 | End: 2018-09-18

## 2018-09-18 RX ORDER — LIDOCAINE HCL/PF 100 MG/5ML
SYRINGE (ML) INTRAVENOUS
Status: DISCONTINUED | OUTPATIENT
Start: 2018-09-18 | End: 2018-09-18

## 2018-09-18 RX ADMIN — PROPOFOL 70 MG: 10 INJECTION, EMULSION INTRAVENOUS at 10:09

## 2018-09-18 RX ADMIN — GLYCOPYRROLATE 0.2 MG: 0.2 INJECTION, SOLUTION INTRAMUSCULAR; INTRAVENOUS at 10:09

## 2018-09-18 RX ADMIN — SODIUM CHLORIDE: 0.9 INJECTION, SOLUTION INTRAVENOUS at 10:09

## 2018-09-18 RX ADMIN — LIDOCAINE HYDROCHLORIDE 60 MG: 20 INJECTION, SOLUTION INTRAVENOUS at 10:09

## 2018-09-18 RX ADMIN — PROPOFOL 150 MCG/KG/MIN: 10 INJECTION, EMULSION INTRAVENOUS at 10:09

## 2018-09-18 NOTE — DISCHARGE INSTRUCTIONS
Colonoscopy     A camera attached to a flexible tube with a viewing lens is used to take video pictures.     Colonoscopy is a test to view the inside of your lower digestive tract (colon and rectum). Sometimes it can show the last part of the small intestine (ileum). During the test, small pieces of tissue may be removed for testing. This is called a biopsy. Small growths, such as polyps, may also be removed.   Why is colonoscopy done?  The test is done to help look for colon cancer. And it can help find the source of abdominal pain, bleeding, and changes in bowel habits. It may be needed once a year, depending on factors such as your:  · Age  · Health history  · Family health history  · Symptoms  · Results from any prior colonoscopy  Risks and possible complications  These include:  · Bleeding               · A puncture or tear in the colon   · Risks of anesthesia  · A cancer lesion not being seen  Getting ready   To prepare for the test:  · Talk with your healthcare provider about the risks of the test (see below). Also ask your healthcare provider about alternatives to the test.  · Tell your healthcare provider about any medicines you take. Also tell him or her about any health conditions you may have.  · Make sure your rectum and colon are empty for the test. Follow the diet and bowel prep instructions exactly. If you dont, the test may need to be rescheduled.  · Plan for a friend or family member to drive you home after the test.     Colonoscopy provides an inside view of the entire colon.     You may discuss the results with your doctor right away or at a future visit.  During the test   The test is usually done in the hospital on an outpatient basis. This means you go home the same day. The procedure takes about 30 minutes. During that time:  · You are given relaxing (sedating) medicine through an IV line. You may be drowsy, or fully asleep.  · The healthcare provider will first give you a physical exam to  check for anal and rectal problems.  · Then the anus is lubricated and the scope inserted.  · If you are awake, you may have a feeling similar to needing to have a bowel movement. You may also feel pressure as air is pumped into the colon. Its OK to pass gas during the procedure.  · Biopsy, polyp removal, or other treatments may be done during the test.  After the test   You may have gas right after the test. It can help to try to pass it to help prevent later bloating. Your healthcare provider may discuss the results with you right away. Or you may need to schedule a follow-up visit to talk about the results. After the test, you can go back to your normal eating and other activities. You may be tired from the sedation and need to rest for a few hours.  Date Last Reviewed: 11/1/2016 © 2000-2017 [employer]. [address] [place] [address], [place] [address]. All rights reserved. This information is not intended as a substitute for professional medical care. Always follow your healthcare professional's instructions.

## 2018-09-18 NOTE — TRANSFER OF CARE
"Anesthesia Transfer of Care Note    Patient: Kel Cui    Procedure(s) Performed: Procedure(s) (LRB):  COLONOSCOPY (N/A)    Patient location: GI    Anesthesia Type: general    Transport from OR: Transported from OR on room air with adequate spontaneous ventilation    Post pain: adequate analgesia    Post assessment: no apparent anesthetic complications and tolerated procedure well    Post vital signs: stable    Level of consciousness: awake    Nausea/Vomiting: no nausea/vomiting    Complications: none    Transfer of care protocol was followed      Last vitals:   Visit Vitals  BP (!) 158/72   Pulse 60   Temp 36.5 °C (97.7 °F)   Ht 5' 5" (1.651 m)   Wt 76.2 kg (168 lb)   SpO2 97%   Breastfeeding? No   BMI 27.96 kg/m²     "

## 2018-09-18 NOTE — H&P
Endoscopy H&P    Procedure : Colonoscopy      personal history of colon polyps and most recent endoscopic exam 1 years ago.  Patient reports colonoscopy at [facility]; she states that she had 5 polyps and therefore, she was to have yearly colonoscopies.  She denies family history of colon cancer.      Past Medical History:   Diagnosis Date    HLD (hyperlipidemia)     Hypertension     Trigeminal neuralgia of right side of face      Sedation Problems: NO  Family History   Problem Relation Age of Onset    Cancer Mother         39    Cancer Sister         Pancreatic    Cancer Brother         Prostate    Diabetes Maternal Aunt     Diabetes Maternal Uncle     Diabetes Paternal Aunt     Diabetes Paternal Uncle      Fam Hx of Sedation Problems: NO  Social History     Socioeconomic History    Marital status:      Spouse name: Not on file    Number of children: Not on file    Years of education: Not on file    Highest education level: Not on file   Social Needs    Financial resource strain: Not on file    Food insecurity - worry: Not on file    Food insecurity - inability: Not on file    Transportation needs - medical: Not on file    Transportation needs - non-medical: Not on file   Occupational History    Occupation:     Occupation: Retired 2005   Tobacco Use    Smoking status: Never Smoker    Smokeless tobacco: Never Used   Substance and Sexual Activity    Alcohol use: No    Drug use: No    Sexual activity: No   Other Topics Concern    Not on file   Social History Narrative    Not on file       Review of Systems - Negative    Respiratory ROS: no cough, shortness of breath, or wheezing  Cardiovascular ROS: no chest pain or dyspnea on exertion  Gastrointestinal ROS: no abdominal pain, change in bowel habits, or black or bloody stools  Musculoskeletal ROS: negative  Neurological ROS: no TIA or stroke  symptoms        Physical Exam:  General: no distress  Head: normocephalic  Airway:  normal oropharynx, airway normal  Neck: supple, symmetrical, trachea midline  Lungs:  clear to auscultation bilaterally and normal respiratory effort  Heart: regular rate and rhythm, S1, S2 normal, no murmur, rub or gallop  Abdomen: soft, non-tender non-distented; bowel sounds normal; no masses,  no organomegaly  Extremities: no cyanosis or edema, or clubbing       Deep Sedation: Mallampati Score per anesthesia     SedationPlan :Choice     ASA : II

## 2018-09-18 NOTE — ANESTHESIA POSTPROCEDURE EVALUATION
"Anesthesia Post Evaluation    Patient: Kel Cui    Procedure(s) Performed: Procedure(s) (LRB):  COLONOSCOPY (N/A)    Final Anesthesia Type: general  Patient location during evaluation: PACU  Patient participation: Yes- Able to Participate  Level of consciousness: awake and alert and oriented  Post-procedure vital signs: reviewed and stable  Pain management: adequate  Airway patency: patent  PONV status at discharge: No PONV  Anesthetic complications: no      Cardiovascular status: blood pressure returned to baseline  Respiratory status: unassisted, room air and spontaneous ventilation  Hydration status: euvolemic  Follow-up not needed.        Visit Vitals  BP (!) 110/55   Pulse (!) 53   Temp 36.3 °C (97.3 °F)   Resp 16   Ht 5' 5" (1.651 m)   Wt 76.2 kg (168 lb)   SpO2 98%   Breastfeeding? No   BMI 27.96 kg/m²       Pain/Sanjuana Score: Pain Assessment Performed: Yes (9/18/2018 11:00 AM)  Presence of Pain: non-verbal indicators absent (9/18/2018 11:00 AM)  Pain Rating Prior to Med Admin: 0 (9/18/2018 10:14 AM)  Sanjuana Score: 7 (9/18/2018 11:00 AM)        "

## 2018-09-18 NOTE — [employer] PATIENT INSTRUCTIONS
Discharge Summary/Instructions after an Endoscopic Procedure  Patient Name: Kel Cui  Patient MRN: [id number]  Patient YOB: 1945 Tuesday, September 18, 2018  Dionicio Jara MD  RESTRICTIONS:  During your procedure today, you received medications for sedation.  These   medications may affect your judgment, balance and coordination.  Therefore,   for 24 hours, you have the following restrictions:   - DO NOT drive a car, operate machinery, make legal/financial decisions,   sign important papers or drink alcohol.    ACTIVITY:  Today: no heavy lifting, straining or running due to procedural   sedation/anesthesia.  The following day: return to full activity including work.  DIET:  Eat and drink normally unless instructed otherwise.     TREATMENT FOR COMMON SIDE EFFECTS:  - Mild abdominal pain, nausea, belching, bloating or excessive gas:  rest,   eat lightly and use a heating pad.  - Sore Throat: treat with throat lozenges and/or gargle with warm salt   water.  - Because air was used during the procedure, expelling large amounts of air   from your rectum or belching is normal.  - If a bowel prep was taken, you may not have a bowel movement for 1-3 days.    This is normal.  SYMPTOMS TO WATCH FOR AND REPORT TO YOUR PHYSICIAN:  1. Abdominal pain or bloating, other than gas cramps.  2. Chest pain.  3. Back pain.  4. Signs of infection such as: chills or fever occurring within 24 hours   after the procedure.  5. Rectal bleeding, which would show as bright red, maroon, or black stools.   (A tablespoon of blood from the rectum is not serious, especially if   hemorrhoids are present.)  6. Vomiting.  7. Weakness or dizziness.  GO DIRECTLY TO THE NEAREST EMERGENCY ROOM IF YOU HAVE ANY OF THE FOLLOWING:      Difficulty breathing              Chills and/or fever over 101 F   Persistent vomiting and/or vomiting blood   Severe abdominal pain   Severe chest pain   Black, tarry stools   Bleeding- more than one  tablespoon   Any other symptom or condition that you feel may need urgent attention  Your doctor recommends these additional instructions:  If any biopsies were taken, your doctors clinic will contact you in 1 to 2   weeks with any results.  - Discharge patient to home (ambulatory).   - Patient has a contact number available for emergencies.  The signs and   symptoms of potential delayed complications were discussed with the   patient.  Return to normal activities tomorrow.  Written discharge   instructions were provided to the patient.   - Resume previous diet.   - Continue present medications.   - Await pathology results.   - Repeat colonoscopy in 3 years for surveillance based on pathology   results.  For questions, problems or results please call your physician - Dionicio Jara MD at Work:  [phone].  [facility], EMERGENCY ROOM PHONE NUMBER: [phone]  IF A COMPLICATION OR EMERGENCY SITUATION ARISES AND YOU ARE UNABLE TO REACH   YOUR PHYSICIAN - GO DIRECTLY TO THE EMERGENCY ROOM.  Dionicio Jara MD  9/18/2018 10:58:06 AM  This report has been verified and signed electronically.  [employer]

## 2018-09-18 NOTE — ANESTHESIA PREPROCEDURE EVALUATION
09/18/2018  Kel Cui is a 72 y.o., female.    Anesthesia Evaluation    I have reviewed the Patient Summary Reports.    I have reviewed the Nursing Notes.   I have reviewed the Medications.     Review of Systems  Anesthesia Hx:  No problems with previous Anesthesia   Denies Personal Hx of Anesthesia complications.   Social:  Non-Smoker    Hematology/Oncology:  Hematology Normal   Oncology Normal     EENT/Dental:EENT/Dental Normal   Cardiovascular:   Exercise tolerance: good Hypertension hyperlipidemia    Pulmonary:  Pulmonary Normal    Renal/:  Renal/ Normal     Hepatic/GI:  Hepatic/GI Normal    Musculoskeletal:  Musculoskeletal Normal    Neurological:  Neurology Normal    Endocrine:  Endocrine Normal    Dermatological:  Skin Normal    Psych:  Psychiatric Normal           Physical Exam  General:  Well nourished    Airway/Jaw/Neck:  Airway Findings: Mouth Opening: Normal Jaw/Neck Findings:  Neck ROM: Normal ROM      Dental:  Dental Findings: In tact   Chest/Lungs:  Chest/Lungs Findings: Clear to auscultation, Normal Respiratory Rate     Heart/Vascular:  Heart Findings: Rate: Normal  Rhythm: Regular Rhythm  Sounds: Normal     Abdomen:  Abdomen Findings:  Normal, Soft, Nontender       Mental Status:  Mental Status Findings:  Alert and Oriented, Cooperative         Anesthesia Plan  Type of Anesthesia, risks & benefits discussed:  Anesthesia Type:  general  Patient's Preference:   Intra-op Monitoring Plan: standard ASA monitors  Intra-op Monitoring Plan Comments:   Post Op Pain Control Plan: IV/PO Opioids PRN  Post Op Pain Control Plan Comments:   Induction:   IV  Beta Blocker:  Patient is not currently on a Beta-Blocker (No further documentation required).       Informed Consent: Patient understands risks and agrees with Anesthesia plan.  Questions answered. Anesthesia consent signed with patient.  ASA  Score: 2     Day of Surgery Review of History & Physical: I have interviewed and examined the patient. I have reviewed the patient's H&P dated:  There are no significant changes.          Ready For Surgery From Anesthesia Perspective.

## 2018-09-24 ENCOUNTER — TELEPHONE (OUTPATIENT)
Dept: INTERNAL MEDICINE | Facility: CLINIC | Age: 73
End: 2018-09-24

## 2018-09-24 DIAGNOSIS — E78.5 HYPERLIPIDEMIA, UNSPECIFIED HYPERLIPIDEMIA TYPE: Primary | ICD-10-CM

## 2018-09-24 DIAGNOSIS — M85.80 OSTEOPENIA, UNSPECIFIED LOCATION: Primary | ICD-10-CM

## 2018-09-24 DIAGNOSIS — I10 HYPERTENSION, UNSPECIFIED TYPE: ICD-10-CM

## 2018-09-24 RX ORDER — HYDROCHLOROTHIAZIDE 25 MG/1
25 TABLET ORAL DAILY
Qty: 90 TABLET | Refills: 3 | Status: SHIPPED | OUTPATIENT
Start: 2018-09-24 | End: 2019-08-19 | Stop reason: SDUPTHER

## 2018-09-24 RX ORDER — PRAVASTATIN SODIUM 40 MG/1
40 TABLET ORAL NIGHTLY
Qty: 90 TABLET | Refills: 3 | Status: SHIPPED | OUTPATIENT
Start: 2018-09-24 | End: 2019-08-19 | Stop reason: SDUPTHER

## 2018-09-24 RX ORDER — FERROUS SULFATE, DRIED 160(50) MG
1 TABLET, EXTENDED RELEASE ORAL 2 TIMES DAILY WITH MEALS
Qty: 90 TABLET | Refills: 3 | COMMUNITY
Start: 2018-09-24

## 2018-09-24 NOTE — TELEPHONE ENCOUNTER
Discussed recent lab and screening results (DEXA, Mamm, Colonsocopy). Pt requests refills of statin and thiazide. Notes that she will now be following primarily with [facility] vs [facility]. Assigning self as PCP.

## 2018-09-25 ENCOUNTER — TELEPHONE (OUTPATIENT)
Dept: ENDOSCOPY | Facility: HOSPITAL | Age: 73
End: 2018-09-25

## 2018-09-26 NOTE — PROGRESS NOTES
SPECIMEN  1) Ascending colon, 6 mm polyp.  FINAL PATHOLOGIC DIAGNOSIS  Colon, ascending, 6 mm polyp, biopsy:  -FRAGMENTS OF TUBULAR ADENOMA  Diagnosed by: Maury Good  (Electronically Signed: 2018-09-20 12:26:26)  Repeat colonoscopy in 5 years       If you have any questions or if I can clarify any of the above please contact me:    [employer] [phone]   Pager [phone]  email [email or web address]  Nurse Valeria Schulte [phone]   Betsy Vazquez:  [phone]    Sincerely  H, Dionicio Jara MD, FACS, FASCRS  Staff Surgeon  Dept of Colon and Rectal Surgery

## 2018-09-27 ENCOUNTER — OFFICE VISIT (OUTPATIENT)
Dept: NEUROLOGY | Facility: CLINIC | Age: 73
End: 2018-09-27
Payer: MEDICARE

## 2018-09-27 VITALS
BODY MASS INDEX: 27.88 KG/M2 | SYSTOLIC BLOOD PRESSURE: 132 MMHG | WEIGHT: 167.31 LBS | HEART RATE: 65 BPM | HEIGHT: 65 IN | DIASTOLIC BLOOD PRESSURE: 75 MMHG

## 2018-09-27 DIAGNOSIS — E78.5 HYPERLIPIDEMIA, UNSPECIFIED HYPERLIPIDEMIA TYPE: ICD-10-CM

## 2018-09-27 DIAGNOSIS — I10 ESSENTIAL HYPERTENSION: ICD-10-CM

## 2018-09-27 DIAGNOSIS — G50.0 TRIGEMINAL NEURALGIA OF RIGHT SIDE OF FACE: Primary | ICD-10-CM

## 2018-09-27 PROCEDURE — 99213 OFFICE O/P EST LOW 20 MIN: CPT | Mod: S$PBB,,, | Performed by: PSYCHIATRY & NEUROLOGY

## 2018-09-27 PROCEDURE — 99213 OFFICE O/P EST LOW 20 MIN: CPT | Mod: PBBFAC | Performed by: PSYCHIATRY & NEUROLOGY

## 2018-09-27 PROCEDURE — 99999 PR PBB SHADOW E&M-EST. PATIENT-LVL III: CPT | Mod: PBBFAC,,, | Performed by: PSYCHIATRY & NEUROLOGY

## 2018-09-27 RX ORDER — OXCARBAZEPINE 300 MG/1
TABLET, FILM COATED ORAL
Qty: 90 TABLET | Refills: 5 | Status: SHIPPED | OUTPATIENT
Start: 2018-09-27 | End: 2018-11-14 | Stop reason: SDUPTHER

## 2018-09-27 RX ORDER — FLUTICASONE PROPIONATE 50 MCG
SPRAY, SUSPENSION (ML) NASAL
COMMUNITY
Start: 2018-09-08 | End: 2019-08-19

## 2018-09-27 NOTE — PATIENT INSTRUCTIONS
Discussed with patient. Continue Trileptal 300 mg 1 tablet in the morning and 2 tablets at bedtime.  Reviewed MRI scan results with patient. Advised strict control of hypertension and hyperlipidemia.

## 2018-09-27 NOTE — PROGRESS NOTES
Subjective:       Patient ID: Kel Cui is a 72 y.o. female.    Chief Complaint:  Facial Pain      History of Present Illness  HPI   This is a 72-year-old female who had been seen by me with complaints of right facial pain occurring intermittently with onset in mid-2017.  She was subsequently seen [facility] emergency room in September 2017 and was advised follow-up with her dentist.  No testing was done.  She has been seen at the emergency room a couple of occasions and was last seen in July 2018.  The facial pain and limited to lower face below the eye on the right and is intermittent but over the past several months has been present daily with fluctuating intensity.  She denies any triggers does sinus problems which she uses Flonase needed.  She denies any hearing loss.  The pain is described as sharp shooting with occasional tingling.  Triggers include chewing on that side.  She otherwise has problem with speech or swallowing. 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    When last seen by me she was started on Trileptal 300 mg twice a day and did report improvement however she noted that she would occasionally get nocturnal symptoms and increased the dosing to 2 tablets at bedtime, and 1 tablet in the morning.  This has helped significantly.  She also reports that she has had recent problem with sinus and allergies and has been using nasal sprays.  The sinus problem may have also been a trigger.  An MRI scan of the brain done was unremarkable.  It did show mild small vessel ischemic changes related to her history of hypertension and hyperlipidemia.       Review of Systems  Review of Systems   Constitutional: Negative.    HENT: Positive for sinus pressure. Negative for hearing loss.    Eyes: Positive for visual disturbance (Blind in the left eye since childhood).   Respiratory: Negative.  Negative for shortness of breath.    Cardiovascular: Negative.  Negative for chest pain and palpitations.    Gastrointestinal: Negative.    Genitourinary: Negative.    Musculoskeletal: Negative.  Negative for back pain, gait problem and neck pain.   Skin: Negative.    Neurological: Negative.  Negative for tremors, seizures, syncope, speech difficulty, weakness, numbness and headaches.        Right-sided facial pain   Psychiatric/Behavioral: Negative.  Negative for confusion and decreased concentration.       Objective:      Neurologic Exam     Mental Status   Oriented to person, place, and time.   Registration: recalls 3 of 3 objects. Follows 3 step commands.   Attention: normal. Concentration: normal.   Speech: speech is normal   Level of consciousness: alert  Knowledge: good.   Able to name object. Able to read. Able to repeat. Able to write. Normal comprehension.     Cranial Nerves   Cranial nerves II through XII intact.     CN V   Right facial sensation deficit: none (No hypersensitivity noted)  Left facial sensation deficit: none  Slight differences noted in the palpebral fissures however it is to be noted the patient had been blind in the left eye since childhood and reports that the left upper eyelid has always been a little droopy.     Motor Exam   Muscle bulk: normal  Overall muscle tone: normal    Strength   Strength 5/5 throughout.     Sensory Exam   Light touch normal.   Proprioception normal.   Pinprick normal.     Gait, Coordination, and Reflexes     Gait  Gait: normal    Coordination   Romberg: negative  Finger to nose coordination: normal    Tremor   Resting tremor: absent  Intention tremor: absent  Action tremor: absent    Reflexes   Right brachioradialis: 1+  Left brachioradialis: 1+  Right biceps: 1+  Left biceps: 1+  Right triceps: 1+  Left triceps: 1+  Right patellar: 1+  Left patellar: 1+  Right achilles: 1+  Left achilles: 1+  Right plantar: normal  Left plantar: normal      Physical Exam   Constitutional: She is oriented to person, place, and time. She appears well-developed and well-nourished.    HENT:   Head: Normocephalic and atraumatic.   Neck: Normal range of motion. Neck supple. Carotid bruit is not present.   Neurological: She is oriented to person, place, and time. She has normal strength. She has a normal Finger-Nose-Finger Test and a normal Romberg Test. Gait normal.   Reflex Scores:       Tricep reflexes are 1+ on the right side and 1+ on the left side.       Bicep reflexes are 1+ on the right side and 1+ on the left side.       Brachioradialis reflexes are 1+ on the right side and 1+ on the left side.       Patellar reflexes are 1+ on the right side and 1+ on the left side.       Achilles reflexes are 1+ on the right side and 1+ on the left side.  Psychiatric: Her speech is normal.   Vitals reviewed.        Assessment:        1. Trigeminal neuralgia of right side of face    2. Essential hypertension    3. Hyperlipidemia, unspecified hyperlipidemia type            Plan:       Discussed with patient. Continue Trileptal 300 mg 1 tablet in the morning and 2 tablets at bedtime.  Reviewed MRI scan results with patient. Advised strict control of hypertension and hyperlipidemia.  Follow-up in 6 months if stable.

## 2018-10-22 RX ORDER — OXCARBAZEPINE 300 MG/1
300 TABLET, FILM COATED ORAL 2 TIMES DAILY
Qty: 60 TABLET | Refills: 1 | Status: SHIPPED | OUTPATIENT
Start: 2018-10-22 | End: 2018-11-14

## 2018-11-14 DIAGNOSIS — I10 ESSENTIAL HYPERTENSION: ICD-10-CM

## 2018-11-14 DIAGNOSIS — G50.0 TRIGEMINAL NEURALGIA OF RIGHT SIDE OF FACE: ICD-10-CM

## 2018-11-14 RX ORDER — OXCARBAZEPINE 300 MG/1
TABLET, FILM COATED ORAL
Qty: 270 TABLET | Refills: 1 | Status: SHIPPED | OUTPATIENT
Start: 2018-11-14 | End: 2019-11-04 | Stop reason: DRUGHIGH

## 2018-12-12 ENCOUNTER — OFFICE VISIT (OUTPATIENT)
Dept: NEUROLOGY | Facility: CLINIC | Age: 73
End: 2018-12-12
Payer: MEDICARE

## 2018-12-12 VITALS
SYSTOLIC BLOOD PRESSURE: 142 MMHG | BODY MASS INDEX: 28.61 KG/M2 | HEART RATE: 59 BPM | DIASTOLIC BLOOD PRESSURE: 77 MMHG | HEIGHT: 65 IN | WEIGHT: 171.75 LBS

## 2018-12-12 DIAGNOSIS — E78.5 HYPERLIPIDEMIA, UNSPECIFIED HYPERLIPIDEMIA TYPE: ICD-10-CM

## 2018-12-12 DIAGNOSIS — I10 ESSENTIAL HYPERTENSION: ICD-10-CM

## 2018-12-12 DIAGNOSIS — G50.0 TRIGEMINAL NEURALGIA OF RIGHT SIDE OF FACE: Primary | ICD-10-CM

## 2018-12-12 PROCEDURE — 99214 OFFICE O/P EST MOD 30 MIN: CPT | Mod: S$PBB,,, | Performed by: PSYCHIATRY & NEUROLOGY

## 2018-12-12 PROCEDURE — 99999 PR PBB SHADOW E&M-EST. PATIENT-LVL III: CPT | Mod: PBBFAC,,, | Performed by: PSYCHIATRY & NEUROLOGY

## 2018-12-12 PROCEDURE — 99213 OFFICE O/P EST LOW 20 MIN: CPT | Mod: PBBFAC | Performed by: PSYCHIATRY & NEUROLOGY

## 2018-12-12 NOTE — PROGRESS NOTES
Subjective:       Patient ID: Kel Cui is a 73 y.o. female.    Chief Complaint:  Facial Pain      History of Present Illness  HPI   This is a 73-year-old female who had been seen by me with complaints of right facial pain occurring intermittently with onset in mid-2017.  She was subsequently seen [facility] emergency room in September 2017 and was advised follow-up with her dentist.  No testing was done.  She has been seen at the emergency room a couple of occasions and was last seen in July 2018.  The facial pain and limited to lower face below the eye on the right and is intermittent but over the past several months has been present daily with fluctuating intensity.  She denies any triggers does sinus problems which she uses Flonase needed.  She denies any hearing loss.  The pain is described as sharp shooting with occasional tingling.  Triggers include chewing on that side.  She otherwise has problem with speech or swallowing. 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    When last seen by me she was started on Trileptal 300 mg twice a day and did report improvement however she noted that she would occasionally get nocturnal symptoms and increased the dosing to 2 tablets at bedtime, and 1 tablet in the morning.  This has helped significantly.  However with the cold weather that exacerbation cane she did see dentist who then referred her to an oral surgeon who did x-rays and advised her to come back and see me has he had nothing to offer her.  An MRI scan of the brain done in August 2018 was unremarkable.  It did show mild small vessel ischemic changes related to her history of hypertension and hyperlipidemia.  Overall symptoms have not changed.       Review of Systems  Review of Systems   Constitutional: Negative.    HENT: Positive for sinus pressure. Negative for hearing loss.    Eyes: Positive for visual disturbance (Blind in the left eye since childhood).   Respiratory: Negative.  Negative for shortness  of breath.    Cardiovascular: Negative.  Negative for chest pain and palpitations.   Gastrointestinal: Negative.    Genitourinary: Negative.    Musculoskeletal: Negative.  Negative for back pain, gait problem and neck pain.   Skin: Negative.    Neurological: Negative.  Negative for tremors, seizures, syncope, speech difficulty, weakness, numbness and headaches.        Right-sided facial pain   Psychiatric/Behavioral: Negative.  Negative for confusion and decreased concentration.       Objective:      Neurologic Exam     Mental Status   Oriented to person, place, and time.   Registration: recalls 3 of 3 objects. Follows 3 step commands.   Attention: normal. Concentration: normal.   Speech: speech is normal   Level of consciousness: alert  Knowledge: good.   Able to name object. Able to read. Able to repeat. Able to write. Normal comprehension.     Cranial Nerves   Cranial nerves II through XII intact.     CN V   Right facial sensation deficit: none (No hypersensitivity noted)  Left facial sensation deficit: none  Slight differences noted in the palpebral fissures however it is to be noted the patient had been blind in the left eye since childhood and reports that the left upper eyelid has always been a little droopy.     Motor Exam   Muscle bulk: normal  Overall muscle tone: normal    Strength   Strength 5/5 throughout.     Sensory Exam   Light touch normal.   Proprioception normal.   Pinprick normal.     Gait, Coordination, and Reflexes     Gait  Gait: normal    Coordination   Romberg: negative  Finger to nose coordination: normal    Tremor   Resting tremor: absent  Intention tremor: absent  Action tremor: absent    Reflexes   Right brachioradialis: 1+  Left brachioradialis: 1+  Right biceps: 1+  Left biceps: 1+  Right triceps: 1+  Left triceps: 1+  Right patellar: 1+  Left patellar: 1+  Right achilles: 1+  Left achilles: 1+  Right plantar: normal  Left plantar: normal      Physical Exam   Constitutional: She is  oriented to person, place, and time. She appears well-developed and well-nourished.   HENT:   Head: Normocephalic and atraumatic.   Neck: Normal range of motion. Neck supple. Carotid bruit is not present.   Neurological: She is oriented to person, place, and time. She has normal strength. She has a normal Finger-Nose-Finger Test and a normal Romberg Test. Gait normal.   Reflex Scores:       Tricep reflexes are 1+ on the right side and 1+ on the left side.       Bicep reflexes are 1+ on the right side and 1+ on the left side.       Brachioradialis reflexes are 1+ on the right side and 1+ on the left side.       Patellar reflexes are 1+ on the right side and 1+ on the left side.       Achilles reflexes are 1+ on the right side and 1+ on the left side.  Psychiatric: Her speech is normal.   Vitals reviewed.        Assessment:        1. Trigeminal neuralgia of right side of face    2. Essential hypertension    3. Hyperlipidemia, unspecified hyperlipidemia type            Plan:       Discussed with patient. Continue Trileptal 300 mg 1 tablet in the morning and 2 tablets at bedtime.  Discussed diagnosis with patient and avoiding exposure to cold. Advised strict control of hypertension and hyperlipidemia.  She is advised to keep her appointment in March 1819.

## 2019-03-20 ENCOUNTER — OFFICE VISIT (OUTPATIENT)
Dept: OTOLARYNGOLOGY | Facility: CLINIC | Age: 74
End: 2019-03-20
Payer: MEDICARE

## 2019-03-20 VITALS
HEIGHT: 65 IN | HEART RATE: 55 BPM | SYSTOLIC BLOOD PRESSURE: 156 MMHG | DIASTOLIC BLOOD PRESSURE: 78 MMHG | WEIGHT: 171.88 LBS | TEMPERATURE: 97 F | BODY MASS INDEX: 28.64 KG/M2

## 2019-03-20 DIAGNOSIS — R68.84 PAIN IN LOWER JAW: Primary | ICD-10-CM

## 2019-03-20 DIAGNOSIS — Z86.69 HISTORY OF TRIGEMINAL NEURALGIA: ICD-10-CM

## 2019-03-20 DIAGNOSIS — H92.01 REFERRED OTALGIA OF RIGHT EAR: ICD-10-CM

## 2019-03-20 PROCEDURE — 99204 OFFICE O/P NEW MOD 45 MIN: CPT | Mod: S$GLB,,, | Performed by: OTOLARYNGOLOGY

## 2019-03-20 PROCEDURE — 99204 PR OFFICE/OUTPT VISIT, NEW, LEVL IV, 45-59 MIN: ICD-10-PCS | Mod: S$GLB,,, | Performed by: OTOLARYNGOLOGY

## 2019-03-20 NOTE — PATIENT INSTRUCTIONS
Proceed with CT scan as ordered, then follow up.  TMJ measures as reviewed including soft diet, moist heat, Advil as needed.  Keep follow up's with Neuro, etc.  Follow up here after above / 2 weeks.

## 2019-03-25 ENCOUNTER — HOSPITAL ENCOUNTER (OUTPATIENT)
Dept: RADIOLOGY | Facility: OTHER | Age: 74
Discharge: HOME | End: 2019-03-25
Attending: OTOLARYNGOLOGY
Payer: MEDICARE

## 2019-03-25 DIAGNOSIS — H92.01 REFERRED OTALGIA OF RIGHT EAR: ICD-10-CM

## 2019-03-25 DIAGNOSIS — R68.84 PAIN IN LOWER JAW: ICD-10-CM

## 2019-03-25 PROCEDURE — 70486 CT MAXILLOFACIAL WITHOUT CONTRAST: ICD-10-PCS | Mod: 26,,, | Performed by: RADIOLOGY

## 2019-03-25 PROCEDURE — 70486 CT MAXILLOFACIAL W/O DYE: CPT | Mod: 26,,, | Performed by: RADIOLOGY

## 2019-03-25 PROCEDURE — 70486 CT MAXILLOFACIAL W/O DYE: CPT | Mod: TC

## 2019-04-03 ENCOUNTER — OFFICE VISIT (OUTPATIENT)
Dept: OTOLARYNGOLOGY | Facility: CLINIC | Age: 74
End: 2019-04-03
Payer: MEDICARE

## 2019-04-03 VITALS
TEMPERATURE: 98 F | DIASTOLIC BLOOD PRESSURE: 76 MMHG | HEIGHT: 65 IN | HEART RATE: 61 BPM | BODY MASS INDEX: 28.84 KG/M2 | SYSTOLIC BLOOD PRESSURE: 147 MMHG | WEIGHT: 173.13 LBS

## 2019-04-03 DIAGNOSIS — Z86.69 HISTORY OF TRIGEMINAL NEURALGIA: ICD-10-CM

## 2019-04-03 DIAGNOSIS — R68.84 PAIN IN LOWER JAW: Primary | ICD-10-CM

## 2019-04-03 PROCEDURE — 99214 OFFICE O/P EST MOD 30 MIN: CPT | Mod: S$GLB,,, | Performed by: OTOLARYNGOLOGY

## 2019-04-03 PROCEDURE — 99214 PR OFFICE/OUTPT VISIT, EST, LEVL IV, 30-39 MIN: ICD-10-PCS | Mod: S$GLB,,, | Performed by: OTOLARYNGOLOGY

## 2019-04-03 NOTE — PROGRESS NOTES
Subjective:       Patient ID: Kel Cui is a 73 y.o. female.    Chief Complaint: Other (Right ear pain on and off since 2017, recently saw a dentist)    New patient for me here today complaining of right greater than left otalgia as of 2017.  Symptoms occur on and off throughout the day and occasionally awakens with pain.  Has worn partial plates for years and denies associated discomfort, however symptoms are worse with chewing.  Recently saw her dentist and specifics unclear.  Not sure if clenching, but tried off the shelf nightguard but unable to use due to found it uncomfortable.  Diagnosed with right trigeminal neuralgia by Neurology and has been on trileptal.  No nasal, throat, or other otolaryngologic complaints.  Past medical history includes blind OS since birth.               Review of Systems   Ears: Positive for ear pain and ear pressure.  Negative for ringing in ear, ear discharge, ear infections, head trauma, taken gentramycin/streptomycin and family history of hearing loss.    Nose:  Negative for nasal obstruction, nasal or sinus surgery and loss of smell.    Mouth/Throat: Negative for pain swallowing, impaired swallowing, hoarse voice, throat mass and neck mass.   Constitutional: Negative for recent unexplained weight loss and fever.    Eyes:  Negative for history of glaucoma.   Cardiovascular:  Positive for history of high blood pressure. Negative for chest pain and palpitations.   Respiratory:  Negative for asthma, emphysema, history of tuberculosis, recent cough and shortness of breath.    Gastrointestinal:  Negative for history of stomach ulcers or pain, acid reflux, indigestion and blood in stool.   Other:  Positive for arthritis. Negative for kidney problem, bladder problem, weakness, disturbances in coordination, slurred, swollen glands, anemia and persistent infections.           Objective:        Vitals:    03/20/19 1048   BP: (!) 156/78   Pulse: (!) 55   Temp: 97.3 °F (36.3 °C)      Body mass index is 28.61 kg/m².  Physical Exam   Constitutional: She is oriented to person, place, and time. She appears well-developed and well-nourished. No distress.   HENT:   Head: Normocephalic and atraumatic.   Right Ear: Tympanic membrane, external ear and ear canal normal.   Left Ear: Tympanic membrane, external ear and ear canal normal.   Nose: No mucosal edema, rhinorrhea or nasal deformity. No epistaxis.   Mouth/Throat: Uvula is midline, oropharynx is clear and moist and mucous membranes are normal. No oral lesions. No uvula swelling. No oropharyngeal exudate, posterior oropharyngeal edema or posterior oropharyngeal erythema.   Tender to palpation over right lower gums.   Neck: Phonation normal. Neck supple. No tracheal deviation present.   Pulmonary/Chest: Effort normal. No respiratory distress.   Lymphadenopathy:     She has no cervical adenopathy.   Neurological: She is alert and oriented to person, place, and time. She displays no weakness.   Skin: Skin is warm and dry.   Psychiatric: She has a normal mood and affect. Her behavior is normal. Her speech is not slurred.       Tests / Results:        Assessment:       1. Pain in lower jaw    2. Referred otalgia of right ear    3. History of trigeminal neuralgia        Plan:        Reviewed all above and considerations and recommendations and answered questions.  CT maxillofacial ordered.  TMJ measures reviewed.  Keep follow-up with neurology, etc.  Follow-up results and recheck here in 2 weeks.

## 2019-04-03 NOTE — PATIENT INSTRUCTIONS
Keep follow up with dentist and neurologist.  Continue TMJ measures as reviewed.  Consider custom  per dentist.   Follow up here if change or problems and as discussed.

## 2019-04-03 NOTE — PROGRESS NOTES
Subjective:       Patient ID: Kel Cui is a 73 y.o. female.    Chief Complaint: Other (go over CT/pain in jaw still same as last)    She returns for results and follow-up.  She states all of her symptoms are the same.  She continues to note intermittent pain in her right lower jaw.  She has considered and tried the TMJ measures as reviewed at her last visit.  She is not aware of clenching per se although tried an off the shelf nightguard in the past but found it too uncomfortable to use.  She believes her upper partial plate fits fine.  She does note she has to be careful when brushing her teeth in the area of the right lower jaw pain which occurs intermittently.  Some improvement with treatment of the trigeminal neuralgia.  Was due for neuro follow-up last week but missed her appointment and is trying to reschedule.  No ear complaints or nasal or throat or other otolaryngologic complaints.  Had her CT scan done since her last visit here as ordered and here to discuss this as well.        Review of Systems   Ears: Positive for ear pain and ear pressure.  Negative for ringing in ear, ear discharge, ear infections, head trauma, taken gentramycin/streptomycin and family history of hearing loss.    Nose:  Negative for nasal obstruction, nasal or sinus surgery and loss of smell.    Mouth/Throat: Negative for pain swallowing, impaired swallowing, hoarse voice, throat mass and neck mass.   Constitutional: Negative for recent unexplained weight loss and fever.    Eyes:  Negative for history of glaucoma.   Cardiovascular:  Positive for history of high blood pressure. Negative for chest pain and palpitations.   Respiratory:  Negative for asthma, emphysema, history of tuberculosis, recent cough and shortness of breath.    Gastrointestinal:  Negative for history of stomach ulcers or pain, acid reflux, indigestion and blood in stool.   Other:  Positive for arthritis. Negative for kidney problem, bladder problem, weakness,  disturbances in coordination, slurred, swollen glands, anemia and persistent infections.           Objective:        Vitals:    04/03/19 1030   BP: (!) 147/76   Pulse: 61   Temp: 97.9 °F (36.6 °C)     Body mass index is 28.81 kg/m².  Physical Exam   Constitutional: She is oriented to person, place, and time. She appears well-developed and well-nourished. No distress.   HENT:   Head: Normocephalic and atraumatic.   Right Ear: Tympanic membrane, external ear and ear canal normal.   Left Ear: Tympanic membrane, external ear and ear canal normal.   Nose: No mucosal edema, rhinorrhea or nasal deformity. No epistaxis.   Mouth/Throat: Uvula is midline, oropharynx is clear and moist and mucous membranes are normal. No oral lesions. No uvula swelling. No oropharyngeal exudate, posterior oropharyngeal edema or posterior oropharyngeal erythema.   Tender to palpation over right lower gums.   Neck: Phonation normal. Neck supple. No tracheal deviation present.   Pulmonary/Chest: Effort normal. No respiratory distress.   Lymphadenopathy:     She has no cervical adenopathy.   Neurological: She is alert and oriented to person, place, and time. She displays no weakness.   Skin: Skin is warm and dry.   Psychiatric: She has a normal mood and affect. Her behavior is normal. Her speech is not slurred.       Tests / Results:    Maxillofacial CT done since last visit results reviewed with patient which does not reveal any abnormality to explain her right jaw pain although there is cervical spondylosis.        Assessment:       1. Pain in lower jaw    2. History of trigeminal neuralgia        Plan:        Reviewed all above and considerations and recommendations and answered questions.    Keep follow up with dentist and neurologist.  Continue TMJ measures as reviewed.  Consider custom  per dentist.   Follow up here if change or problems and as discussed.

## 2019-08-15 DIAGNOSIS — I10 HYPERTENSION, UNSPECIFIED TYPE: ICD-10-CM

## 2019-08-15 NOTE — TELEPHONE ENCOUNTER
----- Message from Lela Campuzano sent at 8/15/2019  3:53 PM CDT -----  Contact: Patient  Prescription Request:     Name of medication: hydroCHLOROthiazide (HYDRODIURIL) 25 MG tablet    Reason for request: Refill    Pharmacy: [facility]/pharmacy #[id number] - [address]    Requesting 90 day supply.    Thank You

## 2019-08-16 DIAGNOSIS — I10 HYPERTENSION, UNSPECIFIED TYPE: ICD-10-CM

## 2019-08-16 RX ORDER — HYDROCHLOROTHIAZIDE 25 MG/1
25 TABLET ORAL DAILY
Qty: 90 TABLET | Refills: 3 | OUTPATIENT
Start: 2019-08-16

## 2019-08-16 RX ORDER — HYDROCHLOROTHIAZIDE 25 MG/1
25 TABLET ORAL DAILY
Qty: 90 TABLET | Refills: 3 | Status: CANCELLED | OUTPATIENT
Start: 2019-08-16

## 2019-08-16 NOTE — TELEPHONE ENCOUNTER
----- Message from Farhana Jones sent at 8/16/2019  3:14 PM CDT -----  Contact: self/[phone]  Pt called in regards to checking the status of a Rx refill request for     hydrochlorothiazide (HYDRODIURIL) 25 MG tablet 90 tablet 3 9/24/2018  No  Take 1 tablet (25 mg total) by mouth once daily.     Pt sees Opal Mae     [facility] [phone]  Please advise

## 2019-08-19 ENCOUNTER — LAB VISIT (OUTPATIENT)
Dept: LAB | Facility: HOSPITAL | Age: 74
End: 2019-08-19
Payer: MEDICARE

## 2019-08-19 ENCOUNTER — OFFICE VISIT (OUTPATIENT)
Dept: INTERNAL MEDICINE | Facility: CLINIC | Age: 74
End: 2019-08-19
Payer: MEDICARE

## 2019-08-19 VITALS
HEIGHT: 65 IN | BODY MASS INDEX: 28.47 KG/M2 | OXYGEN SATURATION: 98 % | SYSTOLIC BLOOD PRESSURE: 130 MMHG | HEART RATE: 53 BPM | WEIGHT: 170.88 LBS | DIASTOLIC BLOOD PRESSURE: 75 MMHG

## 2019-08-19 DIAGNOSIS — M30.1: ICD-10-CM

## 2019-08-19 DIAGNOSIS — E78.5 HYPERLIPIDEMIA, UNSPECIFIED HYPERLIPIDEMIA TYPE: ICD-10-CM

## 2019-08-19 DIAGNOSIS — D75.89 INCREASED MCV: ICD-10-CM

## 2019-08-19 DIAGNOSIS — J34.89 RHINORRHEA: ICD-10-CM

## 2019-08-19 DIAGNOSIS — D72.18: ICD-10-CM

## 2019-08-19 DIAGNOSIS — I10 HYPERTENSION, UNSPECIFIED TYPE: ICD-10-CM

## 2019-08-19 DIAGNOSIS — I10 ESSENTIAL HYPERTENSION: Primary | ICD-10-CM

## 2019-08-19 LAB
ANION GAP SERPL CALC-SCNC: 6 MMOL/L (ref 8–16)
BASOPHILS # BLD AUTO: 0.03 K/UL (ref 0–0.2)
BASOPHILS NFR BLD: 0.4 % (ref 0–1.9)
BUN SERPL-MCNC: 17 MG/DL (ref 8–23)
CALCIUM SERPL-MCNC: 9.4 MG/DL (ref 8.7–10.5)
CHLORIDE SERPL-SCNC: 108 MMOL/L (ref 95–110)
CO2 SERPL-SCNC: 28 MMOL/L (ref 23–29)
CREAT SERPL-MCNC: 0.8 MG/DL (ref 0.5–1.4)
DIFFERENTIAL METHOD: ABNORMAL
EOSINOPHIL # BLD AUTO: 0.1 K/UL (ref 0–0.5)
EOSINOPHIL NFR BLD: 1.2 % (ref 0–8)
ERYTHROCYTE [DISTWIDTH] IN BLOOD BY AUTOMATED COUNT: 13.3 % (ref 11.5–14.5)
EST. GFR  (AFRICAN AMERICAN): >60 ML/MIN/1.73 M^2
EST. GFR  (NON AFRICAN AMERICAN): >60 ML/MIN/1.73 M^2
GLUCOSE SERPL-MCNC: 93 MG/DL (ref 70–110)
HCT VFR BLD AUTO: 38.7 % (ref 37–48.5)
HGB BLD-MCNC: 12.5 G/DL (ref 12–16)
LYMPHOCYTES # BLD AUTO: 2.4 K/UL (ref 1–4.8)
LYMPHOCYTES NFR BLD: 34.7 % (ref 18–48)
MCH RBC QN AUTO: 32.1 PG (ref 27–31)
MCHC RBC AUTO-ENTMCNC: 32.3 G/DL (ref 32–36)
MCV RBC AUTO: 100 FL (ref 82–98)
MONOCYTES # BLD AUTO: 0.4 K/UL (ref 0.3–1)
MONOCYTES NFR BLD: 5.8 % (ref 4–15)
NEUTROPHILS # BLD AUTO: 4 K/UL (ref 1.8–7.7)
NEUTROPHILS NFR BLD: 57.9 % (ref 38–73)
PLATELET # BLD AUTO: 209 K/UL (ref 150–350)
PMV BLD AUTO: 9.8 FL (ref 9.2–12.9)
POTASSIUM SERPL-SCNC: 3.9 MMOL/L (ref 3.5–5.1)
RBC # BLD AUTO: 3.89 M/UL (ref 4–5.4)
SODIUM SERPL-SCNC: 142 MMOL/L (ref 136–145)
WBC # BLD AUTO: 6.88 K/UL (ref 3.9–12.7)

## 2019-08-19 PROCEDURE — 80061 LIPID PANEL: CPT

## 2019-08-19 PROCEDURE — 99213 PR OFFICE/OUTPT VISIT, EST, LEVL III, 20-29 MIN: ICD-10-PCS | Mod: S$PBB,GC,, | Performed by: STUDENT IN AN ORGANIZED HEALTH CARE EDUCATION/TRAINING PROGRAM

## 2019-08-19 PROCEDURE — 85025 COMPLETE CBC W/AUTO DIFF WBC: CPT

## 2019-08-19 PROCEDURE — 99999 PR PBB SHADOW E&M-EST. PATIENT-LVL IV: ICD-10-PCS | Mod: PBBFAC,GC,, | Performed by: STUDENT IN AN ORGANIZED HEALTH CARE EDUCATION/TRAINING PROGRAM

## 2019-08-19 PROCEDURE — 99213 OFFICE O/P EST LOW 20 MIN: CPT | Mod: S$PBB,GC,, | Performed by: STUDENT IN AN ORGANIZED HEALTH CARE EDUCATION/TRAINING PROGRAM

## 2019-08-19 PROCEDURE — 99214 OFFICE O/P EST MOD 30 MIN: CPT | Mod: PBBFAC | Performed by: STUDENT IN AN ORGANIZED HEALTH CARE EDUCATION/TRAINING PROGRAM

## 2019-08-19 PROCEDURE — 99999 PR PBB SHADOW E&M-EST. PATIENT-LVL IV: CPT | Mod: PBBFAC,GC,, | Performed by: STUDENT IN AN ORGANIZED HEALTH CARE EDUCATION/TRAINING PROGRAM

## 2019-08-19 PROCEDURE — 80048 BASIC METABOLIC PNL TOTAL CA: CPT

## 2019-08-19 PROCEDURE — 36415 COLL VENOUS BLD VENIPUNCTURE: CPT

## 2019-08-19 RX ORDER — PRAVASTATIN SODIUM 40 MG/1
40 TABLET ORAL NIGHTLY
Qty: 90 TABLET | Refills: 3 | Status: SHIPPED | OUTPATIENT
Start: 2019-08-19 | End: 2020-10-19 | Stop reason: SDUPTHER

## 2019-08-19 RX ORDER — FLUTICASONE PROPIONATE 50 MCG
2 SPRAY, SUSPENSION (ML) NASAL DAILY
Qty: 50 BOTTLE | Refills: 16 | Status: SHIPPED | OUTPATIENT
Start: 2019-08-19 | End: 2020-10-19 | Stop reason: SDUPTHER

## 2019-08-19 RX ORDER — HYDROCHLOROTHIAZIDE 25 MG/1
25 TABLET ORAL DAILY
Qty: 90 TABLET | Refills: 3 | Status: SHIPPED | OUTPATIENT
Start: 2019-08-19 | End: 2020-09-21 | Stop reason: SDUPTHER

## 2019-08-19 NOTE — PROGRESS NOTES
"Subjective:       Patient ID: Kel Cui is a 73 y.o. female.    Chief Complaint: Annual Exam    HPI   Ms. Kel Cui, 73 yo female w/ PMH of HTN, HLD, cataracts, and trigeminal neuralgia, presents for follow up.     Diagnosed with right trigeminal neuralgia by Neurology and has been on trileptal (300 mg 1 tablet in the morning and 2 tablets at bedtime)    HTN  Does not monitor at home. However, compliant with medication and diet.  Out of medication for the past couple of days, however.      HLD  Compliant with medication and requires refill. Purports balanced diet with meats, vegetables, and fruits. 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  Keeps self busy with [employer] and helping to raise grandson.     HCM:  C-scope 9/18/18  One 6 mm polyp in the ascending colon, removed with a cold snare.  Path: FRAGMENTS OF TUBULAR ADENOMA  Repeat colonoscopy in 3 years for surveillance based on pathology results.    Review of Systems   Constitutional: Negative for activity change, appetite change, chills, fatigue, fever and unexpected weight change.   HENT: Positive for rhinorrhea. Negative for congestion, sore throat and trouble swallowing.    Eyes: Positive for visual disturbance (blind in L eye since childhood).   Respiratory: Negative for cough, shortness of breath and wheezing.    Cardiovascular: Negative for chest pain, palpitations and leg swelling.   Gastrointestinal: Negative for abdominal pain, blood in stool, constipation, diarrhea, nausea and vomiting.   Genitourinary: Negative for dysuria, hematuria and vaginal discharge.   Musculoskeletal: Positive for arthralgias (BL knees) and gait problem (due to arthalgias). Negative for myalgias.   Skin: Negative for wound.   Neurological: Negative for dizziness, light-headedness and headaches.       Objective:      Physical Exam   Constitutional: She is oriented to person, place, and time. She " appears well-developed and well-nourished. No distress.   HENT:   Head: Normocephalic and atraumatic.   Mouth/Throat: Oropharynx is clear and moist. No oropharyngeal exudate.   Eyes: Pupils are equal, round, and reactive to light. Conjunctivae and EOM are normal. No scleral icterus.   Neck: Normal range of motion. Neck supple.   Cardiovascular: Normal rate, regular rhythm, normal heart sounds and intact distal pulses. Exam reveals no gallop and no friction rub.   No murmur heard.  Pulmonary/Chest: Effort normal and breath sounds normal. No respiratory distress. She has no wheezes.   Abdominal: Soft. Bowel sounds are normal. There is no tenderness. There is no guarding.   Musculoskeletal: She exhibits edema (trace edema BL LE L>R; no erythema or tenderness).        Right knee: She exhibits no swelling and no erythema.        Left knee: She exhibits no swelling and no erythema.   BL Knee crepitus   Lymphadenopathy:     She has no cervical adenopathy.   Neurological: She is alert and oriented to person, place, and time.   Skin: Skin is warm and dry. She is not diaphoretic.   Psychiatric: She has a normal mood and affect.   Nursing note and vitals reviewed.      Assessment:       1. Essential hypertension    2. Hypertension, unspecified type    3. Hyperlipidemia, unspecified hyperlipidemia type    4. Allergic angiitis    5. Rhinorrhea    6. Increased MCV        Plan:       Kel was seen today for annual exam.    Diagnoses and all orders for this visit:    Hypertension, essential  -     Refill: hydroCHLOROthiazide (HYDRODIURIL) 25 MG tablet; Take 1 tablet (25 mg total) by mouth once daily.  -     Basic metabolic panel; Future    Hyperlipidemia, unspecified hyperlipidemia type  -     Refill: pravastatin (PRAVACHOL) 40 MG tablet; Take 1 tablet (40 mg total) by mouth every evening.  -     Lipid panel; Future    Seasonal allergies  -     Refill: fluticasone propionate (FLONASE) 50 mcg/actuation nasal spray; 2 sprays (100  mcg total) by Each Nostril route once daily.    Increased MCV  -     Mild on previous CBC; Re-assess, suggest vitamin supplementation, and consider B12, folate lab at future visit    RTC in 1 year and PRN.   Discussed w/ / Gloria.

## 2019-08-20 LAB
CHOLEST SERPL-MCNC: 223 MG/DL (ref 120–199)
CHOLEST/HDLC SERPL: 4.1 {RATIO} (ref 2–5)
HDLC SERPL-MCNC: 54 MG/DL (ref 40–75)
HDLC SERPL: 24.2 % (ref 20–50)
LDLC SERPL CALC-MCNC: 139.8 MG/DL (ref 63–159)
NONHDLC SERPL-MCNC: 169 MG/DL
TRIGL SERPL-MCNC: 146 MG/DL (ref 30–150)

## 2019-08-21 ENCOUNTER — TELEPHONE (OUTPATIENT)
Dept: INTERNAL MEDICINE | Facility: CLINIC | Age: 74
End: 2019-08-21

## 2019-08-21 ENCOUNTER — OFFICE VISIT (OUTPATIENT)
Dept: OTOLARYNGOLOGY | Facility: CLINIC | Age: 74
End: 2019-08-21
Payer: MEDICARE

## 2019-08-21 VITALS
BODY MASS INDEX: 28.61 KG/M2 | SYSTOLIC BLOOD PRESSURE: 141 MMHG | WEIGHT: 171.69 LBS | HEIGHT: 65 IN | HEART RATE: 55 BPM | DIASTOLIC BLOOD PRESSURE: 69 MMHG | TEMPERATURE: 97 F

## 2019-08-21 DIAGNOSIS — Z86.69 HISTORY OF TRIGEMINAL NEURALGIA: ICD-10-CM

## 2019-08-21 DIAGNOSIS — H92.01 REFERRED OTALGIA, RIGHT: ICD-10-CM

## 2019-08-21 DIAGNOSIS — R68.84 PAIN IN LOWER JAW: ICD-10-CM

## 2019-08-21 DIAGNOSIS — M26.69 TMJ CREPITUS: ICD-10-CM

## 2019-08-21 DIAGNOSIS — H92.03 REFERRED OTALGIA OF BOTH EARS: ICD-10-CM

## 2019-08-21 PROCEDURE — 99214 PR OFFICE/OUTPT VISIT, EST, LEVL IV, 30-39 MIN: ICD-10-PCS | Mod: 25,S$GLB,, | Performed by: OTOLARYNGOLOGY

## 2019-08-21 PROCEDURE — 99214 OFFICE O/P EST MOD 30 MIN: CPT | Mod: 25,S$GLB,, | Performed by: OTOLARYNGOLOGY

## 2019-08-21 PROCEDURE — 31575 DIAGNOSTIC LARYNGOSCOPY: CPT | Mod: S$GLB,,, | Performed by: OTOLARYNGOLOGY

## 2019-08-21 PROCEDURE — 31575 LARYNGOSCOPY: ICD-10-PCS | Mod: S$GLB,,, | Performed by: OTOLARYNGOLOGY

## 2019-08-21 NOTE — PATIENT INSTRUCTIONS
Keep follow up with dentist and neurologist.  Continue TMJ measures as reviewed.  Consider custom  per dentist.

## 2019-08-21 NOTE — PROGRESS NOTES
I have reviewed the notes, assessments, and/or procedures performed by Dr. Mae, I concur with her documentation of Kel Cui.

## 2019-08-21 NOTE — PROGRESS NOTES
Subjective:       Patient ID: Kel Cui is a 73 y.o. female.    Chief Complaint: Follow-up (Pain in jaw same)    States here to follow-up on jaw and ear pain which occurs primarily on the right side.  States symptoms are the same since last visit, no better no worse.  After the last visit she was to see her neurologist and dentist but has not pursued either of these.  Unfortunately her neurologist passed away and she is waiting to see a new neurologist in November for follow-up history of trigeminal neuralgia.  Complains of symptoms on and off throughout the day described as a brief sharp pain which starts in the right tragus/TMJ area and radiates anteriorly across her right mandible and demonstrates/traces this area with her finger.  Occurs when eating and has to chew on the left side so it is not as bad.  However pain occurs at other times when not eating as well.  Admits to clenching at times during the day as well as likely in her sleep and has awakened with the symptoms.  Denies difficulty swallowing or other swallow complaints.          Review of Systems   Ears: Positive for ear pain and ear pressure.  Negative for ringing in ear, ear discharge, ear infections, head trauma, taken gentramycin/streptomycin and family history of hearing loss.    Nose:  Negative for nasal obstruction, nasal or sinus surgery and loss of smell.    Mouth/Throat: Negative for pain swallowing, impaired swallowing, hoarse voice, throat mass and neck mass.   Constitutional: Negative for recent unexplained weight loss and fever.    Eyes:  Negative for history of glaucoma.   Cardiovascular:  Positive for history of high blood pressure. Negative for chest pain and palpitations.   Respiratory:  Negative for asthma, emphysema, history of tuberculosis, recent cough and shortness of breath.    Gastrointestinal:  Negative for history of stomach ulcers or pain, acid reflux, indigestion and blood in stool.   Other:  Positive for arthritis.  Negative for kidney problem, bladder problem, weakness, disturbances in coordination, slurred, swollen glands, anemia and persistent infections.           Objective:        Vitals:    08/21/19 1014   BP: (!) 141/69   Pulse: (!) 55   Temp: 97.1 °F (36.2 °C)     Body mass index is 28.57 kg/m².  Physical Exam   Constitutional: She is oriented to person, place, and time. She appears well-developed and well-nourished. No distress.   HENT:   Head: Normocephalic and atraumatic.   Right Ear: Tympanic membrane, external ear and ear canal normal.   Left Ear: Tympanic membrane, external ear and ear canal normal.   Nose: Septal deviation present. No mucosal edema, rhinorrhea or nasal deformity. No epistaxis.   Mouth/Throat: Uvula is midline, oropharynx is clear and moist and mucous membranes are normal. No oral lesions. No uvula swelling. No oropharyngeal exudate, posterior oropharyngeal edema or posterior oropharyngeal erythema.   Crepitus at TMJs with movement.   Neck: Phonation normal. Neck supple. No tracheal deviation present.   Pulmonary/Chest: Effort normal. No respiratory distress.   Lymphadenopathy:     She has no cervical adenopathy.   Neurological: She is alert and oriented to person, place, and time. She displays no weakness.   Skin: Skin is warm and dry.   Psychiatric: She has a normal mood and affect. Her behavior is normal. Her speech is not slurred.       Tests / Results:    Reviewed maxillofacial CT report done 03/25/2019 again.        Assessment:       1. Referred otalgia, right    2. Referred otalgia of both ears    3. Pain in lower jaw    4. TMJ crepitus    5. History of trigeminal neuralgia        Plan:        Reviewed above and office endoscopy and she would like to proceed.  See procedure note.    Again reviewed all above and considerations and recommendations and answered questions.  Call/follow up with dentist and neurologist as soon as able.  TMJ measures reviewed and consider nightguard.  Follow up  here if change or ENT problems.

## 2019-08-21 NOTE — PROCEDURES
Laryngoscopy  Date/Time: 8/21/2019 10:46 AM  Performed by: Marilee Heck MD  Authorized by: Marilee Heck MD     Consent Done?:  Yes (Verbal)  Anesthesia:     Local anesthetic:  Lidocaine 2% and Abe-Synephrine 1/2%    Patient tolerance:  Patient tolerated the procedure well with no immediate complications    Decongestion performed?: Yes    Laryngoscopy:     Areas examined:  Nasal cavities, nasopharynx, oropharynx, hypopharynx, larynx and vocal cords  Nose External:      No external nasal deformity  Nose Intranasal:      Mucosa no polyps     Mucosa ulcers not present     No mucosa lesions present     Septum gross deformity  Nasopharynx:      No mucosa lesions     Adenoids not present     Posterior choanae patent  Larynx/hypopharynx:      No epiglottis lesions     No epiglottis edema     No AE folds lesions     No vocal cord polyps     Equal and normal bilateral     No hypopharynx lesions     No piriform sinus pooling     No piriform sinus lesions

## 2019-08-23 ENCOUNTER — TELEPHONE (OUTPATIENT)
Dept: INTERNAL MEDICINE | Facility: CLINIC | Age: 74
End: 2019-08-23

## 2019-09-03 ENCOUNTER — TELEPHONE (OUTPATIENT)
Dept: INTERNAL MEDICINE | Facility: CLINIC | Age: 74
End: 2019-09-03

## 2019-09-03 DIAGNOSIS — Z12.9 CANCER SCREENING: ICD-10-CM

## 2019-09-03 DIAGNOSIS — Z00.00 HEALTH CARE MAINTENANCE: Primary | ICD-10-CM

## 2019-09-03 DIAGNOSIS — R92.8 ABNORMAL MAMMOGRAM: ICD-10-CM

## 2019-09-03 NOTE — TELEPHONE ENCOUNTER
----- Message from Farhana Jones sent at 9/3/2019 10:23 AM CDT -----  Contact: self/[phone]  Pt called in regards to getting mammogram orders put into the system. She would like her appointment in the morning on any day.    Please advise

## 2019-09-16 ENCOUNTER — TELEPHONE (OUTPATIENT)
Dept: INTERNAL MEDICINE | Facility: CLINIC | Age: 74
End: 2019-09-16

## 2019-09-16 NOTE — TELEPHONE ENCOUNTER
----- Message from Farhana Ventura sent at 9/16/2019  1:06 PM CDT -----  Contact: patient [phone]  Pt was notified that she is due for a mammogram . He last mammo was 8/27/18. Please enter orders and notify patient. She prefers to schedule it herself.

## 2019-09-18 ENCOUNTER — TELEPHONE (OUTPATIENT)
Dept: INTERNAL MEDICINE | Facility: CLINIC | Age: 74
End: 2019-09-18

## 2019-09-18 NOTE — TELEPHONE ENCOUNTER
----- Message from Rocio Melgoza sent at 9/18/2019 10:37 AM CDT -----  Contact: SELF/ [phone]  Caller is requesting to schedule their annual screening mammogram appointment. Order is not listed in Epic.  Please enter order and contact patient to schedule.  Where would they like the mammogram performed?:  imaging building  Would the patient rather receive a phone call back or a response via [facility]?:     Additional information:

## 2019-09-25 ENCOUNTER — TELEPHONE (OUTPATIENT)
Dept: INTERNAL MEDICINE | Facility: CLINIC | Age: 74
End: 2019-09-25

## 2019-09-25 DIAGNOSIS — R92.8 ABNORMAL MAMMOGRAM: ICD-10-CM

## 2019-09-25 DIAGNOSIS — Z12.31 ENCOUNTER FOR SCREENING MAMMOGRAM FOR MALIGNANT NEOPLASM OF BREAST: Primary | ICD-10-CM

## 2019-09-25 NOTE — TELEPHONE ENCOUNTER
----- Message from Farhana Ventura sent at 9/25/2019  2:58 PM CDT -----  Contact: patient [phone]  Patient is due for her annual mammogram and called to ask for orders to be entered. Please call to advise patient when orders are done so she can schedule it for herself.

## 2019-09-30 ENCOUNTER — HOSPITAL ENCOUNTER (OUTPATIENT)
Dept: RADIOLOGY | Facility: HOSPITAL | Age: 74
Discharge: HOME | End: 2019-09-30
Attending: STUDENT IN AN ORGANIZED HEALTH CARE EDUCATION/TRAINING PROGRAM
Payer: MEDICARE

## 2019-09-30 DIAGNOSIS — Z12.31 ENCOUNTER FOR SCREENING MAMMOGRAM FOR MALIGNANT NEOPLASM OF BREAST: ICD-10-CM

## 2019-09-30 DIAGNOSIS — R92.8 ABNORMAL MAMMOGRAM: ICD-10-CM

## 2019-09-30 PROCEDURE — 77063 BREAST TOMOSYNTHESIS BI: CPT | Mod: 26,,, | Performed by: RADIOLOGY

## 2019-09-30 PROCEDURE — 77067 SCR MAMMO BI INCL CAD: CPT | Mod: TC

## 2019-09-30 PROCEDURE — 77067 MAMMO DIGITAL SCREENING BILAT WITH TOMOSYNTHESIS_CAD: ICD-10-PCS | Mod: 26,,, | Performed by: RADIOLOGY

## 2019-09-30 PROCEDURE — 77067 SCR MAMMO BI INCL CAD: CPT | Mod: 26,,, | Performed by: RADIOLOGY

## 2019-09-30 PROCEDURE — 77063 MAMMO DIGITAL SCREENING BILAT WITH TOMOSYNTHESIS_CAD: ICD-10-PCS | Mod: 26,,, | Performed by: RADIOLOGY

## 2019-10-01 ENCOUNTER — TELEPHONE (OUTPATIENT)
Dept: RADIOLOGY | Facility: HOSPITAL | Age: 74
End: 2019-10-01

## 2019-10-01 NOTE — TELEPHONE ENCOUNTER
Spoke with patient and explained mammogram findings.Patient expressed understanding of results. Patient scheduled abnormal mammogram follow up appointment at [facility] [place] on 10/10/2019.

## 2019-10-07 ENCOUNTER — OFFICE VISIT (OUTPATIENT)
Dept: OPTOMETRY | Facility: CLINIC | Age: 74
End: 2019-10-07
Payer: MEDICARE

## 2019-10-07 DIAGNOSIS — I10 ESSENTIAL HYPERTENSION: Primary | ICD-10-CM

## 2019-10-07 DIAGNOSIS — Z96.1 PSEUDOPHAKIA OF BOTH EYES: ICD-10-CM

## 2019-10-07 DIAGNOSIS — Z13.5 GLAUCOMA SCREENING: ICD-10-CM

## 2019-10-07 DIAGNOSIS — H53.002 AMBLYOPIA, LEFT: ICD-10-CM

## 2019-10-07 PROCEDURE — 99212 OFFICE O/P EST SF 10 MIN: CPT | Mod: PBBFAC | Performed by: OPTOMETRIST

## 2019-10-07 PROCEDURE — 99999 PR PBB SHADOW E&M-EST. PATIENT-LVL II: ICD-10-PCS | Mod: PBBFAC,,, | Performed by: OPTOMETRIST

## 2019-10-07 PROCEDURE — 92014 COMPRE OPH EXAM EST PT 1/>: CPT | Mod: S$PBB,,, | Performed by: OPTOMETRIST

## 2019-10-07 PROCEDURE — 92014 PR EYE EXAM, EST PATIENT,COMPREHESV: ICD-10-PCS | Mod: S$PBB,,, | Performed by: OPTOMETRIST

## 2019-10-07 PROCEDURE — 99999 PR PBB SHADOW E&M-EST. PATIENT-LVL II: CPT | Mod: PBBFAC,,, | Performed by: OPTOMETRIST

## 2019-10-07 NOTE — PROGRESS NOTES
HPI     Yearly visit for ocular health and glaucoma check OU.  Pt states that vision seems to be stable OU for both distance and near   since last exam a year ago. Pt denies any flashes or floaters OU at this   time. No pain or discomfort OU. No use of eye gtts at this time.         Pt reports she is not diabetic.             Date                     Value               Ref Range           Status                  08/20/2018               5.2                 4.0 - 5.6 %           Final                      Last edited by Malena Bansal on 10/7/2019  9:55 AM. (History)            Assessment /Plan     For exam results, see Encounter Report.    Essential hypertension  -No retinopathy noted today.  Continued control with primary care physician and annual comprehensive eye exam.    Glaucoma screening  -Monitor with annual eye exam and IOP check    Pseudophakia of both eyes  -clear, centered     Amblyopia, left  -Polycarb for protection OD      RTC 1 yr

## 2019-10-10 ENCOUNTER — HOSPITAL ENCOUNTER (OUTPATIENT)
Dept: RADIOLOGY | Facility: HOSPITAL | Age: 74
Discharge: HOME | End: 2019-10-10
Attending: STUDENT IN AN ORGANIZED HEALTH CARE EDUCATION/TRAINING PROGRAM
Payer: MEDICARE

## 2019-10-10 DIAGNOSIS — R92.8 ABNORMAL MAMMOGRAM: ICD-10-CM

## 2019-10-10 PROCEDURE — 77061 MAMMO DIGITAL DIAGNOSTIC RIGHT WITH TOMOSYNTHESIS_CAD: ICD-10-PCS | Mod: 26,,, | Performed by: RADIOLOGY

## 2019-10-10 PROCEDURE — 77065 DX MAMMO INCL CAD UNI: CPT | Mod: 26,,, | Performed by: RADIOLOGY

## 2019-10-10 PROCEDURE — 77065 MAMMO DIGITAL DIAGNOSTIC RIGHT WITH TOMOSYNTHESIS_CAD: ICD-10-PCS | Mod: 26,,, | Performed by: RADIOLOGY

## 2019-10-10 PROCEDURE — 77061 BREAST TOMOSYNTHESIS UNI: CPT | Mod: TC,PO

## 2019-10-10 PROCEDURE — 77061 BREAST TOMOSYNTHESIS UNI: CPT | Mod: 26,,, | Performed by: RADIOLOGY

## 2019-10-10 PROCEDURE — 77065 DX MAMMO INCL CAD UNI: CPT | Mod: TC,PO

## 2019-11-04 ENCOUNTER — OFFICE VISIT (OUTPATIENT)
Dept: NEUROLOGY | Facility: CLINIC | Age: 74
End: 2019-11-04
Payer: MEDICARE

## 2019-11-04 VITALS
DIASTOLIC BLOOD PRESSURE: 76 MMHG | HEIGHT: 65 IN | SYSTOLIC BLOOD PRESSURE: 124 MMHG | WEIGHT: 172 LBS | HEART RATE: 54 BPM | BODY MASS INDEX: 28.66 KG/M2

## 2019-11-04 DIAGNOSIS — I10 ESSENTIAL HYPERTENSION: ICD-10-CM

## 2019-11-04 DIAGNOSIS — G50.1 ATYPICAL FACIAL PAIN: ICD-10-CM

## 2019-11-04 DIAGNOSIS — G50.0 TRIGEMINAL NEURALGIA OF RIGHT SIDE OF FACE: Primary | ICD-10-CM

## 2019-11-04 PROCEDURE — 99999 PR PBB SHADOW E&M-EST. PATIENT-LVL III: CPT | Mod: PBBFAC,,, | Performed by: NURSE PRACTITIONER

## 2019-11-04 PROCEDURE — 99999 PR PBB SHADOW E&M-EST. PATIENT-LVL III: ICD-10-PCS | Mod: PBBFAC,,, | Performed by: NURSE PRACTITIONER

## 2019-11-04 PROCEDURE — 99213 OFFICE O/P EST LOW 20 MIN: CPT | Mod: PBBFAC | Performed by: NURSE PRACTITIONER

## 2019-11-04 PROCEDURE — 99214 OFFICE O/P EST MOD 30 MIN: CPT | Mod: S$PBB,,, | Performed by: NURSE PRACTITIONER

## 2019-11-04 PROCEDURE — 99214 PR OFFICE/OUTPT VISIT, EST, LEVL IV, 30-39 MIN: ICD-10-PCS | Mod: S$PBB,,, | Performed by: NURSE PRACTITIONER

## 2019-11-04 RX ORDER — OXCARBAZEPINE 150 MG/1
150 TABLET, FILM COATED ORAL EVERY MORNING
Qty: 30 TABLET | Refills: 11 | Status: SHIPPED | OUTPATIENT
Start: 2019-11-04 | End: 2020-11-16

## 2019-11-04 RX ORDER — OXCARBAZEPINE 600 MG/1
600 TABLET, FILM COATED ORAL NIGHTLY
Qty: 30 TABLET | Refills: 11 | Status: SHIPPED | OUTPATIENT
Start: 2019-11-04 | End: 2020-11-16

## 2019-11-04 RX ORDER — DULOXETIN HYDROCHLORIDE 30 MG/1
30 CAPSULE, DELAYED RELEASE ORAL DAILY
Qty: 30 CAPSULE | Refills: 5 | Status: SHIPPED | OUTPATIENT
Start: 2019-11-04 | End: 2019-12-16 | Stop reason: SINTOL

## 2019-11-04 NOTE — PROGRESS NOTES
SUBJECTIVE:  Patient ID: Kel Cui   MRN: [id number]  Referred By: Aaareferral Self  Chief Complaint: Consult    History of Present Illness:   73 y.o. female with HTN, HLD, and right sided trigeminal neuralgia, who presents to clinic alone for evaluation of headaches.  Patient established with [facility], previous patient of Dr. Dennison, last seen December 2018, she is a new patient to me. Right facial pain continues to occur daily, begins in the lower teeth and radiates towards the right ear, lasts no longer than 5 minutes.  Rates pain 10 out of 10, pain is at maximal at onset.  She has tried putting vinegar on a Q-tip and rubbing her gum line which does help. Triggered by chewing on the right side, but can also occur without precipitating event.  Can also be triggered by wind hitting her face, drinking cold water.  Pain began around two years ago, states she has experienced flares of pain daily since pain initially began.  When she last saw Dr. Dennison, she was prescribed oxcarbazepine 300 mg AM and 600 mg nightly, per clinic notes, pain improved following initiation of trileptal, however today she reports she never experienced any relief from trileptal.  Admits she is only taking trileptal 2-3 times per week because it makes her drowsy.      Otherwise, information below is still accurate and current.     Notes from Dr. Dennison:  History of Present Illness  HPI   This is a 73-year-old female who had been seen by me with complaints of right facial pain occurring intermittently with onset in mid-2017.  She was subsequently seen [facility] emergency room in September 2017 and was advised follow-up with her dentist.  No testing was done.  She has been seen at the emergency room a couple of occasions and was last seen in July 2018.  The facial pain and limited to lower face below the eye on the right and is intermittent but over the past several months has been present daily with fluctuating intensity.  She denies  any triggers does sinus problems which she uses Flonase needed.  She denies any hearing loss.  The pain is described as sharp shooting with occasional tingling.  Triggers include chewing on that side.  She otherwise has problem with speech or swallowing. 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     When last seen by me she was started on Trileptal 300 mg twice a day and did report improvement however she noted that she would occasionally get nocturnal symptoms and increased the dosing to 2 tablets at bedtime, and 1 tablet in the morning.  This has helped significantly.  However with the cold weather that exacerbation cane she did see dentist who then referred her to an oral surgeon who did x-rays and advised her to come back and see me has he had nothing to offer her.  An MRI scan of the brain done in August 2018 was unremarkable.  It did show mild small vessel ischemic changes related to her history of hypertension and hyperlipidemia.  Overall symptoms have not changed.    Current Medications:    blood pressure test kit-large Kit, Health maintenance, Disp: , Rfl: 0    calcium-vitamin D3 (OS-MITCHELL 500 + D3) 500 mg(1,250mg) -200 unit per tablet, Take 1 tablet by mouth 2 (two) times daily with meals., Disp: 90 tablet, Rfl: 3    fluticasone propionate (FLONASE) 50 mcg/actuation nasal spray, 2 sprays (100 mcg total) by Each Nostril route once daily., Disp: 50 Bottle, Rfl: 16    FLUZONE HIGH-DOSE 2018-19, PF, 180 mcg/0.5 mL vaccine, ADMINISTER 0.5ML IN THE MUSCLE AS DIRECTED, Disp: , Rfl: 0    hydroCHLOROthiazide (HYDRODIURIL) 25 MG tablet, Take 1 tablet (25 mg total) by mouth once daily., Disp: 90 tablet, Rfl: 3    pravastatin (PRAVACHOL) 40 MG tablet, Take 1 tablet (40 mg total) by mouth every evening., Disp: 90 tablet, Rfl: 3    DULoxetine (CYMBALTA) 30 MG capsule, Take 1 capsule (30 mg total) by mouth once daily., Disp: 30 capsule, Rfl: 5    OXcarbazepine (TRILEPTAL) 150 MG Tab, Take 1 tablet (150 mg  "total) by mouth every morning. Continue oxcarbazepine 600 mg nightly., Disp: 30 tablet, Rfl: 11    OXcarbazepine (TRILEPTAL) 600 MG Tab, Take 1 tablet (600 mg total) by mouth every evening. Continue Oxcarbazepine 150 mg in morning., Disp: 30 tablet, Rfl: 11    Review of Systems - as per HPI, otherwise a balanced 10 systems review is negative.    OBJECTIVE:  Vitals:  /76   Pulse (!) 54   Ht 5' 5" (1.651 m)   BMI 28.57 kg/m²      Physical Exam:  Constitutional: she appears well-developed and well-nourished. she is well groomed. NAD.    HENT:    Head: Normocephalic and atraumatic, oral and nasal mucosa intact.  Right and Left external ear normal. Frontalis was NTTP, temporalis was NTTP   Eyes: Left eyelid ptosis.  Blind in left eye since childhood.   Resp: Respiratory effort is normal.   Musculoskeletal: Normal range of motion. No joint stiffness.   Skin: Skin is warm and dry.  Psychiatric: Normal mood and affect.     Neuro exam:    Mental status:  The patient is alert and oriented to person, place and time.  Language is intact and fluent  Remote and recent memory are intact  Normal attention and concentration  Speech is normal   Knowledge is good     Cranial Nerves:  Slight differences in palpebral fissures - blind in left eye since childhood, reports left eyelid has always been slightly droopy   Facial sensation intact bilaterally, no hypersensitivity noted.   Hearing is intact to finger rub   Uvula in midline. Tongue in midline without fasciculation.   FROM of neck in all (6) directions.  Shoulder shrug symmetrical.    Motor:  Normal muscle bulk and symmetry. No fasciculations were noted.   RUE:appropriate against gravity and medium force as tested 5/5  LUE: appropriate against gravity and medium force as tested 5/5  RLE:appropriate against gravity and medium force as tested 5/5              LLE: appropriate against gravity and medium force as tested 5/5    Sensory:  RUE  intact light touch  LUE intact " light touch    RLE intact light touch  LLE intact light touch    Gait:   Gait - ambulates with assistance of cane     Review of Data:   Notes from internal medicine, ENT, and Dr. Dennison reviewed   Labs:  Lab Visit on 08/19/2019   Component Date Value Ref Range Status    WBC 08/19/2019 6.88  3.90 - 12.70 K/uL Final    RBC 08/19/2019 3.89* 4.00 - 5.40 M/uL Final    Hemoglobin 08/19/2019 12.5  12.0 - 16.0 g/dL Final    Hematocrit 08/19/2019 38.7  37.0 - 48.5 % Final    Mean Corpuscular Volume 08/19/2019 100* 82 - 98 fL Final    Mean Corpuscular Hemoglobin 08/19/2019 32.1* 27.0 - 31.0 pg Final    Mean Corpuscular Hemoglobin Conc 08/19/2019 32.3  32.0 - 36.0 g/dL Final    RDW 08/19/2019 13.3  11.5 - 14.5 % Final    Platelets 08/19/2019 209  150 - 350 K/uL Final    MPV 08/19/2019 9.8  9.2 - 12.9 fL Final    Gran # (ANC) 08/19/2019 4.0  1.8 - 7.7 K/uL Final    Lymph # 08/19/2019 2.4  1.0 - 4.8 K/uL Final    Mono # 08/19/2019 0.4  0.3 - 1.0 K/uL Final    Eos # 08/19/2019 0.1  0.0 - 0.5 K/uL Final    Baso # 08/19/2019 0.03  0.00 - 0.20 K/uL Final    Gran% 08/19/2019 57.9  38.0 - 73.0 % Final    Lymph% 08/19/2019 34.7  18.0 - 48.0 % Final    Mono% 08/19/2019 5.8  4.0 - 15.0 % Final    Eosinophil% 08/19/2019 1.2  0.0 - 8.0 % Final    Basophil% 08/19/2019 0.4  0.0 - 1.9 % Final    Differential Method 08/19/2019 Automated   Final    Cholesterol 08/19/2019 223* 120 - 199 mg/dL Final    Triglycerides 08/19/2019 146  30 - 150 mg/dL Final    HDL 08/19/2019 54  40 - 75 mg/dL Final    LDL Cholesterol 08/19/2019 139.8  63.0 - 159.0 mg/dL Final    Hdl/Cholesterol Ratio 08/19/2019 24.2  20.0 - 50.0 % Final    Total Cholesterol/HDL Ratio 08/19/2019 4.1  2.0 - 5.0 Final    Non-HDL Cholesterol 08/19/2019 169  mg/dL Final    Sodium 08/19/2019 142  136 - 145 mmol/L Final    Potassium 08/19/2019 3.9  3.5 - 5.1 mmol/L Final    Chloride 08/19/2019 108  95 - 110 mmol/L Final    CO2 08/19/2019 28  23 - 29 mmol/L  Final    Glucose 08/19/2019 93  70 - 110 mg/dL Final    BUN, Bld 08/19/2019 17  8 - 23 mg/dL Final    Creatinine 08/19/2019 0.8  0.5 - 1.4 mg/dL Final    Calcium 08/19/2019 9.4  8.7 - 10.5 mg/dL Final    Anion Gap 08/19/2019 6* 8 - 16 mmol/L Final    eGFR if African American 08/19/2019 >60  >60 mL/min/1.73 m^2 Final    eGFR if non African American 08/19/2019 >60  >60 mL/min/1.73 m^2 Final     Imaging:  Results for orders placed or performed during the hospital encounter of 08/31/18   MRI Brain W WO Contrast    Narrative    EXAMINATION:  MRI BRAIN W WO CONTRAST    CLINICAL HISTORY:  Disorders of trigeminal nerve; Disorder of trigeminal nerve, unspecified    TECHNIQUE:  Multiplanar multisequence MR sella turcica/pituitary protocol of the brain was performed before and after the administration of 7 mL Gadavist intravenous contrast.    COMPARISON:  None    FINDINGS:  No areas of restricted diffusion.  The midline structures are unremarkable.    Mild/moderate scattered increased T2/FLAIR signal in the periventricular and subcortical white matter, most consistent with chronic microvascular ischemic change.  No mass effect.  No intracranial hemorrhage.  The ventricles and sulci are within normal limits.  There is mild diffuse cerebral volume loss.    The IAC's are unremarkable.  Visualized portions of the cranial nerves are unremarkable.  No abnormal enhancement.  No CP angle mass identified.    Dural venous sinuses are patent.    The paranasal sinuses and mastoid air cells are clear.      Impression    No acute intracranial abnormality.    Mild/moderate amount of scattered white matter disease, most consistent with chronic microvascular ischemic change.      Electronically signed by: Odilon Tejada MD  Date:    08/31/2018  Time:    14:32   CT Maxillofacial Without Contrast   Order: [id number]   Status:  Final result   Visible to patient:  No (Not Released) Next appt:  12/16/2019 at 11:00 AM in Neurology [facility]  [facility] Dx:  Pain in lower jaw; Referred otalgia o...   Details     Reading Physician Reading Date Result Priority   Teresa Jones MD 3/25/2019       Narrative     EXAMINATION:  CT MAXILLOFACIAL WITHOUT CONTRAST    CLINICAL HISTORY:  right lower jaw pain; Jaw pain    TECHNIQUE:  Low dose axial images, sagittal and coronal reformations were obtained through the face.  Contrast was not administered.    COMPARISON:  None    FINDINGS:  The visualized intracranial content demonstrate changes of periventricular white matter disease.    The frontal sinus is well aerated.    The frontoethmoid recesses are well aerated.    The ethmoid air cells are well aerated.    There is mild mucosal thickening at the floor of the left maxillary sinus, the right maxillary sinus is well aerated.  The maxillary ostia are patent bilaterally.    The sphenoid sinus is well aerated.    No air-fluid level.    The visualized mastoid air cells are well aerated.    The bilateral temporomandibular joints and zygomatic arches appear normal.    The maxilla and mandible are intact.    The ocular globes and orbits demonstrate nothing unusual.  The nasal bone is intact.  The visualized subcutaneous soft tissues appear normal.    Atherosclerotic plaque noted at the bilateral carotid bulb region.    There is degenerative disc disease of the upper cervical spine.  Posterior disc osteophyte complex at C3-C4 and C4-C5 and C6-C7.    Severe left foraminal narrowing noted at C3-C4 and C4-C5.      Impression       No significant abnormality seen to explain patient's right lower jaw pain.    Mild inflammatory changes of the left maxillary sinus.    Spondylosis of the upper cervical spine.      Electronically signed by: Teresa Jones MD  Date: 03/25/2019  Time: 13:29           Note: I have independently reviewed any/all imaging/labs/tests and agree with the report (s) as documented.  Any discrepancies will be as noted/demarcated by free text.   EDU, JUAN-C 11/4/2019    ASSESSMENT:  1. Trigeminal neuralgia of right side of face    2. Atypical facial pain    3. Essential hypertension      PLAN:  - Will adjust dosing of trileptal - decrease morning dose to 150 mg AM, continue 600 mg nightly   - Stressed importance of medication compliance   - Start Cymbalta 30 mg nightly   - Will send compounded topical cream to [facility] - can be applied up to 5x/day as needed for facial pain   - HTN - BP well controlled HCTZ 25 mg daily, management per PCP   - RTC in 6 weeks or sooner if needed      Orders Placed This Encounter    DULoxetine (CYMBALTA) 30 MG capsule    OXcarbazepine (TRILEPTAL) 150 MG Tab    OXcarbazepine (TRILEPTAL) 600 MG Tab     I have discussed realistic goals of care with patient at length as well as medication options, and need for lifestyle adjustment. I have explained that treatment will take time. We have agreed that the goal will be to reduce frequency/intensity/quantity of HA, not to be completely HA free. I have explained my non narcotic policy regarding headache treatment.    Patient to track frequency of headaches.  Patient agreeable to work on lifestyle adjustments.    Questions and concerns were sought and answered to the patient's stated verbal satisfaction.  The patient verbalizes understanding and agreement with the above stated treatment plan.     CC: MD Britta Matson, BRENP-C  [facility]  [phone]    Dr. Lyn was available during today's encounter.

## 2019-12-16 ENCOUNTER — OFFICE VISIT (OUTPATIENT)
Dept: NEUROLOGY | Facility: CLINIC | Age: 74
End: 2019-12-16
Payer: MEDICARE

## 2019-12-16 VITALS
HEART RATE: 56 BPM | BODY MASS INDEX: 27.95 KG/M2 | WEIGHT: 167.75 LBS | HEIGHT: 65 IN | SYSTOLIC BLOOD PRESSURE: 134 MMHG | DIASTOLIC BLOOD PRESSURE: 79 MMHG

## 2019-12-16 DIAGNOSIS — G50.0 TRIGEMINAL NEURALGIA OF RIGHT SIDE OF FACE: Primary | ICD-10-CM

## 2019-12-16 PROCEDURE — 99213 OFFICE O/P EST LOW 20 MIN: CPT | Mod: PBBFAC | Performed by: NURSE PRACTITIONER

## 2019-12-16 PROCEDURE — 99999 PR PBB SHADOW E&M-EST. PATIENT-LVL III: ICD-10-PCS | Mod: PBBFAC,,, | Performed by: NURSE PRACTITIONER

## 2019-12-16 PROCEDURE — 99213 PR OFFICE/OUTPT VISIT, EST, LEVL III, 20-29 MIN: ICD-10-PCS | Mod: S$PBB,,, | Performed by: NURSE PRACTITIONER

## 2019-12-16 PROCEDURE — 99213 OFFICE O/P EST LOW 20 MIN: CPT | Mod: S$PBB,,, | Performed by: NURSE PRACTITIONER

## 2019-12-16 PROCEDURE — 1159F PR MEDICATION LIST DOCUMENTED IN MEDICAL RECORD: ICD-10-PCS | Mod: ,,, | Performed by: NURSE PRACTITIONER

## 2019-12-16 PROCEDURE — 1126F PR PAIN SEVERITY QUANTIFIED, NO PAIN PRESENT: ICD-10-PCS | Mod: ,,, | Performed by: NURSE PRACTITIONER

## 2019-12-16 PROCEDURE — 1126F AMNT PAIN NOTED NONE PRSNT: CPT | Mod: ,,, | Performed by: NURSE PRACTITIONER

## 2019-12-16 PROCEDURE — 1159F MED LIST DOCD IN RCRD: CPT | Mod: ,,, | Performed by: NURSE PRACTITIONER

## 2019-12-16 PROCEDURE — 99999 PR PBB SHADOW E&M-EST. PATIENT-LVL III: CPT | Mod: PBBFAC,,, | Performed by: NURSE PRACTITIONER

## 2019-12-16 RX ORDER — DICLOFENAC SODIUM 10 MG/G
2 GEL TOPICAL 2 TIMES DAILY PRN
Qty: 2 TUBE | Refills: 5 | Status: SHIPPED | OUTPATIENT
Start: 2019-12-16 | End: 2020-11-16

## 2019-12-16 NOTE — PROGRESS NOTES
Established Patient   SUBJECTIVE:  Patient ID: Kel Cui   Chief Complaint: Follow-up    History of Present Illness:  Kel Cui is a 74 y.o. female who presents to clinic alone for follow-up of headaches.     Recommendations made at last Office Visit on 11/4/2019:  - Will adjust dosing of trileptal - decrease morning dose to 150 mg AM, continue 600 mg nightly   - Stressed importance of medication compliance   - Start Cymbalta 30 mg nightly   - Will send compounded topical cream to [facility] - can be applied up to 5x/day as needed for facial pain   - HTN - BP well controlled HCTZ 25 mg daily, management per PCP   - RTC in 6 weeks or sooner if needed      12/16/2019 - Interval History:  Pain has continued to feel the same, she has been taking trileptal 600 mg 3-4 nights per week and cymbalta 30 mg 3-4 nights per week.  She does not feel cymbalta has been helping any, but does feel her pain is less frequent with higher dose of trileptal at night.  She would like to stop cymbalta but otherwise does not wish to make any further adjustments to her treatment plan at this time.     Otherwise, information below is still accurate and current.     History of Present Illness:   73 y.o. female with HTN, HLD, and right sided trigeminal neuralgia, who presents to clinic alone for evaluation of headaches.  Patient established with [facility], previous patient of Dr. Dennison, last seen December 2018, she is a new patient to me. Right facial pain continues to occur daily, begins in the lower teeth and radiates towards the right ear, lasts no longer than 5 minutes.  Rates pain 10 out of 10, pain is at maximal at onset.  She has tried putting vinegar on a Q-tip and rubbing her gum line which does help. Triggered by chewing on the right side, but can also occur without precipitating event.  Can also be triggered by wind hitting her face, drinking cold water.  Pain began around two years ago, states she has experienced  flares of pain daily since pain initially began.  When she last saw Dr. Dennison, she was prescribed oxcarbazepine 300 mg AM and 600 mg nightly, per clinic notes, pain improved following initiation of trileptal, however today she reports she never experienced any relief from trileptal.  Admits she is only taking trileptal 2-3 times per week because it makes her drowsy.       Otherwise, information below is still accurate and current.      Notes from Dr. Dennison:  History of Present Illness  HPI   This is a 73-year-old female who had been seen by me with complaints of right facial pain occurring intermittently with onset in mid-2017.  She was subsequently seen [facility] emergency room in September 2017 and was advised follow-up with her dentist.  No testing was done.  She has been seen at the emergency room a couple of occasions and was last seen in July 2018.  The facial pain and limited to lower face below the eye on the right and is intermittent but over the past several months has been present daily with fluctuating intensity.  She denies any triggers does sinus problems which she uses Flonase needed.  She denies any hearing loss.  The pain is described as sharp shooting with occasional tingling.  Triggers include chewing on that side.  She otherwise has problem with speech or swallowing. 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     When last seen by me she was started on Trileptal 300 mg twice a day and did report improvement however she noted that she would occasionally get nocturnal symptoms and increased the dosing to 2 tablets at bedtime, and 1 tablet in the morning.  This has helped significantly.  However with the cold weather that exacerbation cane she did see dentist who then referred her to an oral surgeon who did x-rays and advised her to come back and see me has he had nothing to offer her.  An MRI scan of the brain done in August 2018 was unremarkable.  It did show mild small vessel ischemic changes  "related to her history of hypertension and hyperlipidemia.  Overall symptoms have not changed.    Current Medications:    blood pressure test kit-large Kit, Health maintenance, Disp: , Rfl: 0    calcium-vitamin D3 (OS-MITCHELL 500 + D3) 500 mg(1,250mg) -200 unit per tablet, Take 1 tablet by mouth 2 (two) times daily with meals., Disp: 90 tablet, Rfl: 3    diclofenac sodium (VOLTAREN) 1 % Gel, Apply 2 g topically 2 (two) times daily as needed., Disp: 2 Tube, Rfl: 5    fluticasone propionate (FLONASE) 50 mcg/actuation nasal spray, 2 sprays (100 mcg total) by Each Nostril route once daily., Disp: 50 Bottle, Rfl: 16    FLUZONE HIGH-DOSE 2018-19, PF, 180 mcg/0.5 mL vaccine, ADMINISTER 0.5ML IN THE MUSCLE AS DIRECTED, Disp: , Rfl: 0    hydroCHLOROthiazide (HYDRODIURIL) 25 MG tablet, Take 1 tablet (25 mg total) by mouth once daily., Disp: 90 tablet, Rfl: 3    OXcarbazepine (TRILEPTAL) 150 MG Tab, Take 1 tablet (150 mg total) by mouth every morning. Continue oxcarbazepine 600 mg nightly., Disp: 30 tablet, Rfl: 11    OXcarbazepine (TRILEPTAL) 600 MG Tab, Take 1 tablet (600 mg total) by mouth every evening. Continue Oxcarbazepine 150 mg in morning., Disp: 30 tablet, Rfl: 11    pravastatin (PRAVACHOL) 40 MG tablet, Take 1 tablet (40 mg total) by mouth every evening., Disp: 90 tablet, Rfl: 3    Review of Systems - as per HPI, otherwise a balanced 10 systems review is negativesdf.    OBJECTIVE:  Vitals:  /79   Pulse (!) 56   Ht 5' 5" (1.651 m)   Wt 76.1 kg (167 lb 12.3 oz)   BMI 27.92 kg/m²      Physical Exam:  Constitutional: she appears well-developed and well-nourished. she is well groomed. NAD    Review of Data:   Labs:  No visits with results within 3 Month(s) from this visit.   Latest known visit with results is:   Lab Visit on 08/19/2019   Component Date Value Ref Range Status    WBC 08/19/2019 6.88  3.90 - 12.70 K/uL Final    RBC 08/19/2019 3.89* 4.00 - 5.40 M/uL Final    Hemoglobin 08/19/2019 12.5  12.0 " - 16.0 g/dL Final    Hematocrit 08/19/2019 38.7  37.0 - 48.5 % Final    Mean Corpuscular Volume 08/19/2019 100* 82 - 98 fL Final    Mean Corpuscular Hemoglobin 08/19/2019 32.1* 27.0 - 31.0 pg Final    Mean Corpuscular Hemoglobin Conc 08/19/2019 32.3  32.0 - 36.0 g/dL Final    RDW 08/19/2019 13.3  11.5 - 14.5 % Final    Platelets 08/19/2019 209  150 - 350 K/uL Final    MPV 08/19/2019 9.8  9.2 - 12.9 fL Final    Gran # (ANC) 08/19/2019 4.0  1.8 - 7.7 K/uL Final    Lymph # 08/19/2019 2.4  1.0 - 4.8 K/uL Final    Mono # 08/19/2019 0.4  0.3 - 1.0 K/uL Final    Eos # 08/19/2019 0.1  0.0 - 0.5 K/uL Final    Baso # 08/19/2019 0.03  0.00 - 0.20 K/uL Final    Gran% 08/19/2019 57.9  38.0 - 73.0 % Final    Lymph% 08/19/2019 34.7  18.0 - 48.0 % Final    Mono% 08/19/2019 5.8  4.0 - 15.0 % Final    Eosinophil% 08/19/2019 1.2  0.0 - 8.0 % Final    Basophil% 08/19/2019 0.4  0.0 - 1.9 % Final    Differential Method 08/19/2019 Automated   Final    Cholesterol 08/19/2019 223* 120 - 199 mg/dL Final    Triglycerides 08/19/2019 146  30 - 150 mg/dL Final    HDL 08/19/2019 54  40 - 75 mg/dL Final    LDL Cholesterol 08/19/2019 139.8  63.0 - 159.0 mg/dL Final    Hdl/Cholesterol Ratio 08/19/2019 24.2  20.0 - 50.0 % Final    Total Cholesterol/HDL Ratio 08/19/2019 4.1  2.0 - 5.0 Final    Non-HDL Cholesterol 08/19/2019 169  mg/dL Final    Sodium 08/19/2019 142  136 - 145 mmol/L Final    Potassium 08/19/2019 3.9  3.5 - 5.1 mmol/L Final    Chloride 08/19/2019 108  95 - 110 mmol/L Final    CO2 08/19/2019 28  23 - 29 mmol/L Final    Glucose 08/19/2019 93  70 - 110 mg/dL Final    BUN, Bld 08/19/2019 17  8 - 23 mg/dL Final    Creatinine 08/19/2019 0.8  0.5 - 1.4 mg/dL Final    Calcium 08/19/2019 9.4  8.7 - 10.5 mg/dL Final    Anion Gap 08/19/2019 6* 8 - 16 mmol/L Final    eGFR if African American 08/19/2019 >60  >60 mL/min/1.73 m^2 Final    eGFR if non African American 08/19/2019 >60  >60 mL/min/1.73 m^2 Final      Imaging:  Results for orders placed or performed during the hospital encounter of 08/31/18   MRI Brain W WO Contrast    Narrative    EXAMINATION:  MRI BRAIN W WO CONTRAST    CLINICAL HISTORY:  Disorders of trigeminal nerve; Disorder of trigeminal nerve, unspecified    TECHNIQUE:  Multiplanar multisequence MR sella turcica/pituitary protocol of the brain was performed before and after the administration of 7 mL Gadavist intravenous contrast.    COMPARISON:  None    FINDINGS:  No areas of restricted diffusion.  The midline structures are unremarkable.    Mild/moderate scattered increased T2/FLAIR signal in the periventricular and subcortical white matter, most consistent with chronic microvascular ischemic change.  No mass effect.  No intracranial hemorrhage.  The ventricles and sulci are within normal limits.  There is mild diffuse cerebral volume loss.    The IAC's are unremarkable.  Visualized portions of the cranial nerves are unremarkable.  No abnormal enhancement.  No CP angle mass identified.    Dural venous sinuses are patent.    The paranasal sinuses and mastoid air cells are clear.      Impression    No acute intracranial abnormality.    Mild/moderate amount of scattered white matter disease, most consistent with chronic microvascular ischemic change.      Electronically signed by: Odilon Tejada MD  Date:    08/31/2018  Time:    14:32     Note: I have independently reviewed any/all imaging/labs/tests and agree with the report (s) as documented.  Any discrepancies will be as noted/demarcated by free text.  JUAN SORENSEN-CLAYTON 12/16/2019    ASSESSMENT:  1. Trigeminal neuralgia of right side of face      PLAN:  - D/C Cymbalta  - Continue trileptal 600 mg nightly   - Will send prescription for voltaren gel to apply twice daily as needed for facial pain   - RTC in 6 months or sooner if needed      Orders Placed This Encounter    diclofenac sodium (VOLTAREN) 1 % Gel     Questions and concerns were sought and answered  to the patient's stated verbal satisfaction.  The patient verbalizes understanding and agreement with the above stated treatment plan.     CC: MD Britta Matson, FNP-C  [facility]   [phone]    Dr. Lyn was available during today's encounter.

## 2020-07-15 DIAGNOSIS — Z71.89 COMPLEX CARE COORDINATION: ICD-10-CM

## 2020-09-21 DIAGNOSIS — I10 ESSENTIAL HYPERTENSION: ICD-10-CM

## 2020-09-21 DIAGNOSIS — I10 HYPERTENSION, UNSPECIFIED TYPE: ICD-10-CM

## 2020-09-21 RX ORDER — HYDROCHLOROTHIAZIDE 25 MG/1
25 TABLET ORAL DAILY
Qty: 30 TABLET | Refills: 0 | Status: SHIPPED | OUTPATIENT
Start: 2020-09-21 | End: 2020-10-16 | Stop reason: SDUPTHER

## 2020-09-21 NOTE — TELEPHONE ENCOUNTER
----- Message from An Vaz sent at 9/21/2020  1:49 PM CDT -----  Contact: Self   Requesting an RX refill or new RX.  Is this a refill or new RX:    RX name and strength: hydroCHLOROthiazide (HYDRODIURIL) 25 MG tablet  Directions (copy/paste from chart):  Route: Take 1 tablet (25 mg total) by mouth once daily. - Oral  Is this a 30 day or 90 day RX:    Local pharmacy or mail order pharmacy:    Pharmacy name and phone # (copy/paste from chart):   [id number] - [address] [phone] (Phone)  [phone] (Fax)      Comments:

## 2020-10-16 DIAGNOSIS — I10 ESSENTIAL HYPERTENSION: ICD-10-CM

## 2020-10-16 DIAGNOSIS — I10 HYPERTENSION, UNSPECIFIED TYPE: ICD-10-CM

## 2020-10-16 RX ORDER — HYDROCHLOROTHIAZIDE 25 MG/1
25 TABLET ORAL DAILY
Qty: 30 TABLET | Refills: 0 | Status: SHIPPED | OUTPATIENT
Start: 2020-10-16 | End: 2020-10-19 | Stop reason: SDUPTHER

## 2020-10-19 DIAGNOSIS — I10 HYPERTENSION, UNSPECIFIED TYPE: ICD-10-CM

## 2020-10-19 DIAGNOSIS — D72.18: ICD-10-CM

## 2020-10-19 DIAGNOSIS — I10 ESSENTIAL HYPERTENSION: ICD-10-CM

## 2020-10-19 DIAGNOSIS — M30.1: ICD-10-CM

## 2020-10-19 DIAGNOSIS — E78.5 HYPERLIPIDEMIA, UNSPECIFIED HYPERLIPIDEMIA TYPE: ICD-10-CM

## 2020-10-19 RX ORDER — HYDROCHLOROTHIAZIDE 25 MG/1
25 TABLET ORAL DAILY
Qty: 30 TABLET | Refills: 0 | Status: SHIPPED | OUTPATIENT
Start: 2020-10-19 | End: 2020-11-16 | Stop reason: SDUPTHER

## 2020-10-19 RX ORDER — FLUTICASONE PROPIONATE 50 MCG
2 SPRAY, SUSPENSION (ML) NASAL DAILY
Qty: 9.9 ML | Refills: 0 | Status: SHIPPED | OUTPATIENT
Start: 2020-10-19 | End: 2020-11-16 | Stop reason: SDUPTHER

## 2020-10-19 RX ORDER — PRAVASTATIN SODIUM 40 MG/1
40 TABLET ORAL NIGHTLY
Qty: 30 TABLET | Refills: 0 | Status: SHIPPED | OUTPATIENT
Start: 2020-10-19 | End: 2020-11-16 | Stop reason: SDUPTHER

## 2020-10-19 NOTE — TELEPHONE ENCOUNTER
----- Message from Trena Vance sent at 10/19/2020 10:23 AM CDT -----  Regarding: self   Requesting an RX refill or new RX.  Is this a refill or new RX: refill   RX name and strength:hydroCHLOROthiazide (HYDRODIURIL) 25 MG tablet  Is this a 30 day or 90 day RX: 90   Pharmacy name and phone # (copy/paste from chart):    Comments:          Requesting an RX refill or new RX.  Is this a refill or new RX: refill   RX name and strength:pravastatin (PRAVACHOL) 40 MG tablet  Is this a 30 day or 90 day RX: 90   Pharmacy name and phone # (copy/paste from chart):     [id number] - [address] [phone] (Phone)  [phone] (Fax)    Requesting an RX refill or new RX.  Is this a refill or new RX:   RX name and strength:fluticasone propionate (FLONASE) 50 mcg/actuation nasal spray    Caller is requesting to schedule their annual screening mammogram appointment. Order is not listed in Epic.  Please enter order and contact patient to schedule.

## 2020-10-22 ENCOUNTER — TELEPHONE (OUTPATIENT)
Dept: INTERNAL MEDICINE | Facility: CLINIC | Age: 75
End: 2020-10-22

## 2020-10-22 NOTE — TELEPHONE ENCOUNTER
----- Message from Juliane Jaimes sent at 10/22/2020  1:55 PM CDT -----  Regarding: mammo  Contact: patient [phone]  Patient received a letter from [facility] about her having a mammo.  Please call and advise

## 2020-10-25 DIAGNOSIS — Z12.31 ENCOUNTER FOR SCREENING MAMMOGRAM FOR MALIGNANT NEOPLASM OF BREAST: ICD-10-CM

## 2020-10-25 DIAGNOSIS — Z00.00 HEALTHCARE MAINTENANCE: ICD-10-CM

## 2020-10-25 DIAGNOSIS — Z12.9 CANCER SCREENING: Primary | ICD-10-CM

## 2020-11-04 ENCOUNTER — HOSPITAL ENCOUNTER (OUTPATIENT)
Dept: RADIOLOGY | Facility: HOSPITAL | Age: 75
Discharge: HOME | End: 2020-11-04
Attending: STUDENT IN AN ORGANIZED HEALTH CARE EDUCATION/TRAINING PROGRAM
Payer: MEDICARE

## 2020-11-04 VITALS — WEIGHT: 178.56 LBS | BODY MASS INDEX: 29.75 KG/M2 | HEIGHT: 65 IN

## 2020-11-04 DIAGNOSIS — Z12.31 ENCOUNTER FOR SCREENING MAMMOGRAM FOR MALIGNANT NEOPLASM OF BREAST: ICD-10-CM

## 2020-11-04 DIAGNOSIS — Z12.9 CANCER SCREENING: ICD-10-CM

## 2020-11-04 DIAGNOSIS — Z00.00 HEALTHCARE MAINTENANCE: ICD-10-CM

## 2020-11-04 PROCEDURE — 77067 SCR MAMMO BI INCL CAD: CPT | Mod: TC

## 2020-11-04 PROCEDURE — 77063 BREAST TOMOSYNTHESIS BI: CPT | Mod: 26,,, | Performed by: RADIOLOGY

## 2020-11-04 PROCEDURE — 77067 MAMMO DIGITAL SCREENING BILAT WITH TOMO: ICD-10-PCS | Mod: 26,,, | Performed by: RADIOLOGY

## 2020-11-04 PROCEDURE — 77067 SCR MAMMO BI INCL CAD: CPT | Mod: 26,,, | Performed by: RADIOLOGY

## 2020-11-04 PROCEDURE — 77063 MAMMO DIGITAL SCREENING BILAT WITH TOMO: ICD-10-PCS | Mod: 26,,, | Performed by: RADIOLOGY

## 2020-11-06 ENCOUNTER — TELEPHONE (OUTPATIENT)
Dept: RADIOLOGY | Facility: HOSPITAL | Age: 75
End: 2020-11-06

## 2020-11-06 NOTE — TELEPHONE ENCOUNTER
Spoke with patient and explained mammogram findings.Patient expressed understanding of results. Patient scheduled abnormal mammogram follow up appointment at [facility] on 11/11/2020.

## 2020-11-11 ENCOUNTER — HOSPITAL ENCOUNTER (OUTPATIENT)
Dept: RADIOLOGY | Facility: HOSPITAL | Age: 75
Discharge: HOME | End: 2020-11-11
Attending: STUDENT IN AN ORGANIZED HEALTH CARE EDUCATION/TRAINING PROGRAM
Payer: MEDICARE

## 2020-11-11 DIAGNOSIS — R92.8 ABNORMAL MAMMOGRAM: ICD-10-CM

## 2020-11-11 PROCEDURE — 77061 BREAST TOMOSYNTHESIS UNI: CPT | Mod: TC

## 2020-11-11 PROCEDURE — 77065 MAMMO DIGITAL DIAGNOSTIC RIGHT WITH TOMO: ICD-10-PCS | Mod: 26,RT,, | Performed by: RADIOLOGY

## 2020-11-11 PROCEDURE — 77065 DX MAMMO INCL CAD UNI: CPT | Mod: TC,RT

## 2020-11-11 PROCEDURE — 76642 ULTRASOUND BREAST LIMITED: CPT | Mod: TC,RT

## 2020-11-11 PROCEDURE — 77061 MAMMO DIGITAL DIAGNOSTIC RIGHT WITH TOMO: ICD-10-PCS | Mod: 26,RT,, | Performed by: RADIOLOGY

## 2020-11-11 PROCEDURE — 77065 DX MAMMO INCL CAD UNI: CPT | Mod: 26,RT,, | Performed by: RADIOLOGY

## 2020-11-11 PROCEDURE — 76642 US BREAST RIGHT LIMITED: ICD-10-PCS | Mod: 26,RT,, | Performed by: RADIOLOGY

## 2020-11-11 PROCEDURE — 76642 ULTRASOUND BREAST LIMITED: CPT | Mod: 26,RT,, | Performed by: RADIOLOGY

## 2020-11-11 PROCEDURE — 77061 BREAST TOMOSYNTHESIS UNI: CPT | Mod: 26,RT,, | Performed by: RADIOLOGY

## 2020-11-16 ENCOUNTER — OFFICE VISIT (OUTPATIENT)
Dept: INTERNAL MEDICINE | Facility: CLINIC | Age: 75
End: 2020-11-16
Payer: MEDICARE

## 2020-11-16 VITALS
SYSTOLIC BLOOD PRESSURE: 122 MMHG | BODY MASS INDEX: 27.29 KG/M2 | WEIGHT: 163.81 LBS | DIASTOLIC BLOOD PRESSURE: 74 MMHG | OXYGEN SATURATION: 98 % | HEIGHT: 65 IN | HEART RATE: 57 BPM

## 2020-11-16 DIAGNOSIS — G50.0 TRIGEMINAL NEURALGIA OF RIGHT SIDE OF FACE: ICD-10-CM

## 2020-11-16 DIAGNOSIS — M30.1: ICD-10-CM

## 2020-11-16 DIAGNOSIS — E78.5 HYPERLIPIDEMIA, UNSPECIFIED HYPERLIPIDEMIA TYPE: ICD-10-CM

## 2020-11-16 DIAGNOSIS — I10 ESSENTIAL HYPERTENSION: ICD-10-CM

## 2020-11-16 DIAGNOSIS — D72.18: ICD-10-CM

## 2020-11-16 DIAGNOSIS — R79.9 ABNORMAL FINDING OF BLOOD CHEMISTRY, UNSPECIFIED: ICD-10-CM

## 2020-11-16 DIAGNOSIS — R79.89 OTHER SPECIFIED ABNORMAL FINDINGS OF BLOOD CHEMISTRY: ICD-10-CM

## 2020-11-16 DIAGNOSIS — Z00.00 HEALTHCARE MAINTENANCE: Primary | ICD-10-CM

## 2020-11-16 PROCEDURE — 99397 PR PREVENTIVE VISIT,EST,65 & OVER: ICD-10-PCS | Mod: S$PBB,GC,, | Performed by: STUDENT IN AN ORGANIZED HEALTH CARE EDUCATION/TRAINING PROGRAM

## 2020-11-16 PROCEDURE — 99999 PR PBB SHADOW E&M-EST. PATIENT-LVL III: ICD-10-PCS | Mod: PBBFAC,GC,, | Performed by: STUDENT IN AN ORGANIZED HEALTH CARE EDUCATION/TRAINING PROGRAM

## 2020-11-16 PROCEDURE — 99213 OFFICE O/P EST LOW 20 MIN: CPT | Mod: PBBFAC | Performed by: STUDENT IN AN ORGANIZED HEALTH CARE EDUCATION/TRAINING PROGRAM

## 2020-11-16 PROCEDURE — 99397 PER PM REEVAL EST PAT 65+ YR: CPT | Mod: S$PBB,GC,, | Performed by: STUDENT IN AN ORGANIZED HEALTH CARE EDUCATION/TRAINING PROGRAM

## 2020-11-16 PROCEDURE — 99999 PR PBB SHADOW E&M-EST. PATIENT-LVL III: CPT | Mod: PBBFAC,GC,, | Performed by: STUDENT IN AN ORGANIZED HEALTH CARE EDUCATION/TRAINING PROGRAM

## 2020-11-16 RX ORDER — A/SINGAPORE/GP1908/2015 IVR-180 (AN A/MICHIGAN/45/2015 (H1N1)PDM09-LIKE VIRUS, A/HONG KONG/4801/2014, NYMC X-263B (H3N2) (AN A/HONG KONG/4801/2014-LIKE VIRUS), AND B/BRISBANE/60/2008, WILD TYPE (A B/BRISBANE/60/2008-LIKE VIRUS) 15; 15; 15 UG/.5ML; UG/.5ML; UG/.5ML
INJECTION, SUSPENSION INTRAMUSCULAR
COMMUNITY
Start: 2020-09-16 | End: 2024-02-29 | Stop reason: ALTCHOICE

## 2020-11-16 RX ORDER — HYDROCHLOROTHIAZIDE 25 MG/1
25 TABLET ORAL DAILY
Qty: 90 TABLET | Refills: 3 | Status: SHIPPED | OUTPATIENT
Start: 2020-11-16 | End: 2021-02-23 | Stop reason: SDUPTHER

## 2020-11-16 RX ORDER — DICLOFENAC SODIUM 10 MG/G
GEL TOPICAL
Qty: 1 TUBE | Refills: 1 | Status: SHIPPED | OUTPATIENT
Start: 2020-11-16 | End: 2021-02-23 | Stop reason: SDUPTHER

## 2020-11-16 RX ORDER — PRAVASTATIN SODIUM 40 MG/1
40 TABLET ORAL NIGHTLY
Qty: 90 TABLET | Refills: 3 | Status: SHIPPED | OUTPATIENT
Start: 2020-11-16 | End: 2021-02-23 | Stop reason: SDUPTHER

## 2020-11-16 RX ORDER — FLUTICASONE PROPIONATE 50 MCG
2 SPRAY, SUSPENSION (ML) NASAL DAILY
Qty: 9.9 ML | Refills: 3 | Status: SHIPPED | OUTPATIENT
Start: 2020-11-16 | End: 2021-02-23 | Stop reason: SDUPTHER

## 2020-11-16 NOTE — PROGRESS NOTES
I have personally reviewed the results and orders, reviewed pertinent labs and imaging, and discussed the plan of care with the resident/intern. I have reviewed the note attached in its entirety and agree with the documented findings and proposed plan.    General check-up. General labs ordered.  Does not want to see neurology at this point for TGN. Will order voltaran gel.   Does not want shingles vaccine this visit.            Grant Constantino MD  Internal Medicine   Assistant Staff, Chief Resident

## 2020-11-16 NOTE — PROGRESS NOTES
"[facility]  Resident Clinic Note    Chief Complaint      Chief Complaint   Patient presents with    Annual Exam     History of Present Illness      Kel Cui is a 75 y.o. female with chronic conditions of HTN, HLD, cataracts, and trigeminal neuralgia who presents today for: annual    Right trigeminal neuralgia   Follows w/ neurology  On trileptal 600 mg nightly and voltaren gel   Pt reports taking neither  Does not want to follow-up with neuro however open to trying the voltaren gel  Has been on other medications such as gabapentin in the past; however, she did not tolerate side effects    HTN  Does not monitor at home. However, compliant with medication and diet.     HLD  Compliant with medication and requires refill. Purports balanced diet with meats, vegetables, and fruits. 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  Keeps self busy with [employer] and helping to raise grandson.      HCM:  C-scope 9/18/18  One 6 mm polyp in the ascending colon, removed with a cold snare.  Path: FRAGMENTS OF TUBULAR ADENOMA  Repeat colonoscopy in 3 years for surveillance based on pathology results.    Activity:   20min day walking with cane    No issues w/ ADL    Diet: "tries to have a balanced diet"  Breakfast: bagel and fruit   Lunch: sandwch   Dinner protein and veggies  Hydration: drinks quart of water daily     Only daughter is in [place] - however, niece lives close by Pt and checks in occasionally     Pt strives to be as functionally independent for as long as possible - does not want to move to [place]     Past Medical History:  Past Medical History:   Diagnosis Date    HLD (hyperlipidemia)     Hypertension     Trigeminal neuralgia of right side of face        Past Surgical History:   has a past surgical history that includes Hysterectomy (1977); Cataract extraction w/  intraocular lens implant (Right, 2015); Cataract extraction w/  intraocular lens " implant (Left); and Colonoscopy (N/A, 9/18/2018).    Family History:  family history includes Cancer in her brother, mother, and sister; Diabetes in her maternal aunt, maternal uncle, paternal aunt, and paternal uncle.     Social History:  Social History     Tobacco Use    Smoking status: Never Smoker    Smokeless tobacco: Never Used   Substance Use Topics    Alcohol use: No    Drug use: No       I personally reviewed all past medical, surgical, social and family history.    Review of Systems   Constitutional: Negative for chills, fever and malaise/fatigue.   HENT: Negative for congestion, ear pain, sinus pain and sore throat.    Eyes: Negative for discharge and redness.   Respiratory: Negative for cough, shortness of breath and wheezing.    Cardiovascular: Negative for chest pain, palpitations, orthopnea, claudication, leg swelling and PND.   Gastrointestinal: Negative for abdominal pain, constipation, diarrhea, heartburn, nausea and vomiting.   Genitourinary: Negative for dysuria and frequency.   Musculoskeletal: Negative for falls, joint pain and myalgias.   Skin: Negative for rash.   Neurological: Negative for dizziness, weakness and headaches.        Medications:  Outpatient Encounter Medications as of 11/16/2020   Medication Sig Dispense Refill    blood pressure test kit-large Kit Health maintenance  0    calcium-vitamin D3 (OS-MITCHELL 500 + D3) 500 mg(1,250mg) -200 unit per tablet Take 1 tablet by mouth 2 (two) times daily with meals. 90 tablet 3    diclofenac sodium (VOLTAREN) 1 % Gel Apply twice daily as needed 1 Tube 1    FLUAD QUAD 2020-21,65Y UP,,PF, 60 mcg (15 mcg x 4)/0.5 mL Syrg ADM 0.5ML IM UTD      fluticasone propionate (FLONASE) 50 mcg/actuation nasal spray 2 sprays (100 mcg total) by Each Nostril route once daily. For further refills, please make appointment w/ doctor. 9.9 mL 3    FLUZONE HIGH-DOSE 2018-19, PF, 180 mcg/0.5 mL vaccine ADMINISTER 0.5ML IN THE MUSCLE AS DIRECTED  0     hydroCHLOROthiazide (HYDRODIURIL) 25 MG tablet Take 1 tablet (25 mg total) by mouth once daily. For further refills, please make appointment w/ doctor. 90 tablet 3    pravastatin (PRAVACHOL) 40 MG tablet Take 1 tablet (40 mg total) by mouth every evening. For further refills, please make appointment w/ doctor. 90 tablet 3    [DISCONTINUED] diclofenac sodium (VOLTAREN) 1 % Gel Apply 2 g topically 2 (two) times daily as needed. 2 Tube 5    [DISCONTINUED] fluticasone propionate (FLONASE) 50 mcg/actuation nasal spray 2 sprays (100 mcg total) by Each Nostril route once daily. For further refills, please make appointment w/ doctor. 9.9 mL 0    [DISCONTINUED] hydroCHLOROthiazide (HYDRODIURIL) 25 MG tablet Take 1 tablet (25 mg total) by mouth once daily. For further refills, please make appointment w/ doctor. 30 tablet 0    [DISCONTINUED] OXcarbazepine (TRILEPTAL) 150 MG Tab Take 1 tablet (150 mg total) by mouth every morning. Continue oxcarbazepine 600 mg nightly. 30 tablet 11    [DISCONTINUED] OXcarbazepine (TRILEPTAL) 600 MG Tab Take 1 tablet (600 mg total) by mouth every evening. Continue Oxcarbazepine 150 mg in morning. 30 tablet 11    [DISCONTINUED] pravastatin (PRAVACHOL) 40 MG tablet Take 1 tablet (40 mg total) by mouth every evening. For further refills, please make appointment w/ doctor. 30 tablet 0     No facility-administered encounter medications on file as of 11/16/2020.        Allergies:  Review of patient's allergies indicates:  No Known Allergies    Health Maintenance:  Immunization History   Administered Date(s) Administered    Influenza 09/22/2009, 10/15/2010, 12/22/2014    Influenza - High Dose - PF (65 years and older) 11/08/2011, 03/09/2017, 11/13/2017, 11/07/2018, 10/14/2019    Influenza - Trivalent (ADULT) 09/22/2009, 10/15/2010, 12/22/2014    Influenza - Trivalent - PF (ADULT) 09/16/2020    Pneumococcal 11/18/2009    Pneumococcal Conjugate - 13 Valent 09/07/2017    Pneumococcal  "Polysaccharide - 23 Valent 11/01/2010    Tdap 03/09/2017      Health Maintenance   Topic Date Due    Hepatitis C Screening  1945    DEXA SCAN  09/13/2021    Colonoscopy  09/18/2021    Lipid Panel  08/19/2024    TETANUS VACCINE  03/09/2027    Pneumococcal Vaccine (65+ Low/Medium Risk)  Completed        Physical Exam      Vital Signs  Pulse: (!) 57  SpO2: 98 %  BP: 122/74  Height and Weight  Height: 5' 5" (165.1 cm)  Weight: 74.3 kg (163 lb 12.8 oz)  BSA (Calculated - sq m): 1.85 sq meters  BMI (Calculated): 27.3  Weight in (lb) to have BMI = 25: 149.9]    Physical Exam  Constitutional:       General: She is not in acute distress.     Appearance: She is not toxic-appearing or diaphoretic.   HENT:      Head: Normocephalic and atraumatic.      Right Ear: External ear normal.      Left Ear: External ear normal.   Eyes:      General: No scleral icterus.  Neck:      Musculoskeletal: Normal range of motion.   Cardiovascular:      Rate and Rhythm: Regular rhythm.      Pulses: Normal pulses.      Heart sounds: No murmur.   Pulmonary:      Effort: Pulmonary effort is normal.      Breath sounds: No wheezing or rales.   Abdominal:      General: Bowel sounds are normal.      Palpations: Abdomen is soft.      Tenderness: There is no abdominal tenderness.   Musculoskeletal:      Right lower leg: No edema.      Left lower leg: No edema.   Lymphadenopathy:      Cervical: No cervical adenopathy.   Skin:     General: Skin is warm.   Neurological:      Mental Status: She is alert.          Laboratory:  CBC:  Recent Labs   Lab 08/20/18  1125 08/19/19  1121   WBC 6.91 6.88   RBC 4.10 3.89 L   Hemoglobin 13.2 12.5   Hematocrit 40.6 38.7   Platelets 230 209   MCV 99 H 100 H   MCH 32.2 H 32.1 H   MCHC 32.5 32.3     CMP:  Recent Labs   Lab 08/20/18  1125 08/19/19  1121   Glucose 95 93   Calcium 10.1 9.4   Albumin 4.0  --    Total Protein 8.0  --    Sodium 142 142   Potassium 4.2 3.9   CO2 28 28   Chloride 105 108   BUN 16 17 "   Alkaline Phosphatase 104  --    ALT 23  --    AST 23  --    Total Bilirubin 0.4  --      URINALYSIS:       LIPIDS:  Recent Labs   Lab 08/20/18  1125 08/19/19  1121   HDL 62 54   Cholesterol 262 H 223 H   Triglycerides 174 H 146   LDL Cholesterol 165.2 H 139.8   HDL/Cholesterol Ratio 23.7 24.2   Non-HDL Cholesterol 200 169   Total Cholesterol/HDL Ratio 4.2 4.1     TSH:      A1C:  Recent Labs   Lab 08/20/18  1125   Hemoglobin A1C 5.2       Assessment/Plan     Kel Cui is a 75 y.o.female with:    1. Healthcare maintenance  Pt declines shingles. S/p flu shot this season. Routine labs today.   - CBC Auto Differential; Future  - Comprehensive Metabolic Panel; Future  - Lipid Panel; Future  - Hemoglobin A1C; Future  - Hepatitis C Antibody; Future    2. Essential hypertension  Well controlled. Continue low Na diet and HCTZ.  - Comprehensive Metabolic Panel; Future  - hydroCHLOROthiazide (HYDRODIURIL) 25 MG tablet; Take 1 tablet (25 mg total) by mouth once daily. For further refills, please make appointment w/ doctor.  Dispense: 90 tablet; Refill: 3    3. Allergic angiitis  Refill:  - fluticasone propionate (FLONASE) 50 mcg/actuation nasal spray; 2 sprays (100 mcg total) by Each Nostril route once daily. For further refills, please make appointment w/ doctor.  Dispense: 9.9 mL; Refill: 3    4. Hyperlipidemia, unspecified hyperlipidemia type  Labs today.   - Comprehensive Metabolic Panel; Future  - pravastatin (PRAVACHOL) 40 MG tablet; Take 1 tablet (40 mg total) by mouth every evening. For further refills, please make appointment w/ doctor.  Dispense: 90 tablet; Refill: 3    5. Trigeminal neuralgia of right side of face  Pt does not want to follow-up w/ neuro at present. Hesitant to start systemic medication. Per last neuro note, will try Voltaren gel which Pt never tried previoulsy despite being prescribed it.   - diclofenac sodium (VOLTAREN) 1 % Gel; Apply twice daily as needed  Dispense: 1 Tube; Refill: 1    6.  Abnormal finding of blood chemistry, unspecified   F/u macrocytosis.   - CBC Auto Differential; Future    7. Other specified abnormal findings of blood chemistry   F/u HLD.   - Lipid Panel; Future  - Hemoglobin A1C; Future       Chronic conditions status updated as per HPI.  Other than changes above, cont current medications and maintain follow up with specialists.  Return to clinic in 12 months or prn.    Plan d/w Dr. Constantino.     Opal Mae MD  PGY3  Internal Medicine

## 2020-11-16 NOTE — PATIENT INSTRUCTIONS
Diclofenac Gel: Apply to clean, dry, intact skin; do not apply to open wounds, eyes, or mucous membranes. Do not cover with occlusive dressings or apply heat, sunscreens, cosmetics, lotions, moisturizers, insect repellents, or other topical medications to affected area. Showering/bathing should be avoided for ?1 hour following application. Wash hands immediately after application (unless hands are treated joint, then wait ?1 hour to wash hands). Avoid sunlight to exposure areas. Avoid wearing clothes or gloves for ?10 minutes after application.

## 2020-11-17 ENCOUNTER — TELEPHONE (OUTPATIENT)
Dept: INTERNAL MEDICINE | Facility: CLINIC | Age: 75
End: 2020-11-17

## 2020-11-17 DIAGNOSIS — E78.5 HYPERLIPIDEMIA, UNSPECIFIED HYPERLIPIDEMIA TYPE: ICD-10-CM

## 2020-11-17 DIAGNOSIS — E87.6 HYPOKALEMIA: Primary | ICD-10-CM

## 2020-11-17 NOTE — TELEPHONE ENCOUNTER
Pt called regarding labs. Pt cholesterol/LDL higher. Pt confesses she hasn't been taking statin every day due to hearing that it causes muscle cramps. Pt advised to re-start and take daily and notify clinic if she develops any muscle cramping. Low K potentially due to anti-hypertensive. Advised high K diet at present and will consider different medication at follow-up.     The 10-year ASCVD risk score (Columbiarosio BURCIAGA Jr., et al., 2013) is: 21.1%    Values used to calculate the score:      Age: 75 years      Sex: Female      Is Non- : Yes      Diabetic: No      Tobacco smoker: No      Systolic Blood Pressure: 122 mmHg      Is BP treated: Yes      HDL Cholesterol: 52 mg/dL      Total Cholesterol: 305 mg/dL    Opal Mae MD  PGY3  Internal Medicine

## 2021-02-15 ENCOUNTER — TELEPHONE (OUTPATIENT)
Dept: INTERNAL MEDICINE | Facility: CLINIC | Age: 76
End: 2021-02-15

## 2021-02-20 ENCOUNTER — LAB VISIT (OUTPATIENT)
Dept: LAB | Facility: HOSPITAL | Age: 76
End: 2021-02-20
Payer: MEDICARE

## 2021-02-20 DIAGNOSIS — E78.5 HYPERLIPIDEMIA, UNSPECIFIED HYPERLIPIDEMIA TYPE: ICD-10-CM

## 2021-02-20 DIAGNOSIS — E87.6 HYPOKALEMIA: ICD-10-CM

## 2021-02-20 LAB
ANION GAP SERPL CALC-SCNC: 8 MMOL/L (ref 8–16)
BUN SERPL-MCNC: 14 MG/DL (ref 8–23)
CALCIUM SERPL-MCNC: 9.5 MG/DL (ref 8.7–10.5)
CHLORIDE SERPL-SCNC: 104 MMOL/L (ref 95–110)
CHOLEST SERPL-MCNC: 192 MG/DL (ref 120–199)
CHOLEST/HDLC SERPL: 3.7 {RATIO} (ref 2–5)
CO2 SERPL-SCNC: 30 MMOL/L (ref 23–29)
CREAT SERPL-MCNC: 1 MG/DL (ref 0.5–1.4)
EST. GFR  (AFRICAN AMERICAN): >60 ML/MIN/1.73 M^2
EST. GFR  (NON AFRICAN AMERICAN): 54.9 ML/MIN/1.73 M^2
GLUCOSE SERPL-MCNC: 101 MG/DL (ref 70–110)
HDLC SERPL-MCNC: 52 MG/DL (ref 40–75)
HDLC SERPL: 27.1 % (ref 20–50)
LDLC SERPL CALC-MCNC: 115.6 MG/DL (ref 63–159)
NONHDLC SERPL-MCNC: 140 MG/DL
POTASSIUM SERPL-SCNC: 3.5 MMOL/L (ref 3.5–5.1)
SODIUM SERPL-SCNC: 142 MMOL/L (ref 136–145)
TRIGL SERPL-MCNC: 122 MG/DL (ref 30–150)

## 2021-02-20 PROCEDURE — 80061 LIPID PANEL: CPT

## 2021-02-20 PROCEDURE — 80048 BASIC METABOLIC PNL TOTAL CA: CPT

## 2021-02-20 PROCEDURE — 36415 COLL VENOUS BLD VENIPUNCTURE: CPT

## 2021-02-23 ENCOUNTER — RESEARCH ENCOUNTER (OUTPATIENT)
Dept: RESEARCH | Facility: HOSPITAL | Age: 76
End: 2021-02-23

## 2021-02-23 ENCOUNTER — TELEPHONE (OUTPATIENT)
Dept: RESEARCH | Facility: HOSPITAL | Age: 76
End: 2021-02-23

## 2021-02-23 ENCOUNTER — OFFICE VISIT (OUTPATIENT)
Dept: INTERNAL MEDICINE | Facility: CLINIC | Age: 76
End: 2021-02-23
Payer: MEDICARE

## 2021-02-23 VITALS
OXYGEN SATURATION: 99 % | SYSTOLIC BLOOD PRESSURE: 128 MMHG | WEIGHT: 168 LBS | HEIGHT: 65 IN | DIASTOLIC BLOOD PRESSURE: 70 MMHG | BODY MASS INDEX: 27.99 KG/M2 | HEART RATE: 53 BPM

## 2021-02-23 DIAGNOSIS — M30.1: ICD-10-CM

## 2021-02-23 DIAGNOSIS — E78.5 HYPERLIPIDEMIA, UNSPECIFIED HYPERLIPIDEMIA TYPE: ICD-10-CM

## 2021-02-23 DIAGNOSIS — D72.18: ICD-10-CM

## 2021-02-23 DIAGNOSIS — G50.0 TRIGEMINAL NEURALGIA OF RIGHT SIDE OF FACE: ICD-10-CM

## 2021-02-23 DIAGNOSIS — I10 ESSENTIAL HYPERTENSION: ICD-10-CM

## 2021-02-23 PROCEDURE — 99213 OFFICE O/P EST LOW 20 MIN: CPT | Mod: S$PBB,GC,, | Performed by: STUDENT IN AN ORGANIZED HEALTH CARE EDUCATION/TRAINING PROGRAM

## 2021-02-23 PROCEDURE — 99213 PR OFFICE/OUTPT VISIT, EST, LEVL III, 20-29 MIN: ICD-10-PCS | Mod: S$PBB,GC,, | Performed by: STUDENT IN AN ORGANIZED HEALTH CARE EDUCATION/TRAINING PROGRAM

## 2021-02-23 PROCEDURE — 99999 PR PBB SHADOW E&M-EST. PATIENT-LVL III: CPT | Mod: PBBFAC,GC,, | Performed by: STUDENT IN AN ORGANIZED HEALTH CARE EDUCATION/TRAINING PROGRAM

## 2021-02-23 PROCEDURE — 99213 OFFICE O/P EST LOW 20 MIN: CPT | Mod: PBBFAC | Performed by: STUDENT IN AN ORGANIZED HEALTH CARE EDUCATION/TRAINING PROGRAM

## 2021-02-23 PROCEDURE — 99999 PR PBB SHADOW E&M-EST. PATIENT-LVL III: ICD-10-PCS | Mod: PBBFAC,GC,, | Performed by: STUDENT IN AN ORGANIZED HEALTH CARE EDUCATION/TRAINING PROGRAM

## 2021-02-23 RX ORDER — DICLOFENAC SODIUM 10 MG/G
GEL TOPICAL
Qty: 1 TUBE | Refills: 1 | Status: SHIPPED | OUTPATIENT
Start: 2021-02-23 | End: 2023-11-07

## 2021-02-23 RX ORDER — HYDROCHLOROTHIAZIDE 25 MG/1
25 TABLET ORAL DAILY
Qty: 90 TABLET | Refills: 3 | Status: SHIPPED | OUTPATIENT
Start: 2021-02-23 | End: 2021-08-03 | Stop reason: SDUPTHER

## 2021-02-23 RX ORDER — FLUTICASONE PROPIONATE 50 MCG
2 SPRAY, SUSPENSION (ML) NASAL DAILY
Qty: 9.9 ML | Refills: 3 | Status: SHIPPED | OUTPATIENT
Start: 2021-02-23 | End: 2021-08-03 | Stop reason: SDUPTHER

## 2021-02-23 RX ORDER — PRAVASTATIN SODIUM 40 MG/1
40 TABLET ORAL NIGHTLY
Qty: 90 TABLET | Refills: 3 | Status: SHIPPED | OUTPATIENT
Start: 2021-02-23 | End: 2021-08-03 | Stop reason: SDUPTHER

## 2021-08-03 ENCOUNTER — OFFICE VISIT (OUTPATIENT)
Dept: INTERNAL MEDICINE | Facility: CLINIC | Age: 76
End: 2021-08-03
Payer: MEDICARE

## 2021-08-03 VITALS
SYSTOLIC BLOOD PRESSURE: 125 MMHG | BODY MASS INDEX: 26.16 KG/M2 | WEIGHT: 157.19 LBS | DIASTOLIC BLOOD PRESSURE: 78 MMHG

## 2021-08-03 DIAGNOSIS — I10 ESSENTIAL HYPERTENSION: ICD-10-CM

## 2021-08-03 DIAGNOSIS — M30.1: ICD-10-CM

## 2021-08-03 DIAGNOSIS — Z00.00 HEALTH CARE MAINTENANCE: Primary | ICD-10-CM

## 2021-08-03 DIAGNOSIS — D72.18: ICD-10-CM

## 2021-08-03 DIAGNOSIS — I10 HYPERTENSION, UNSPECIFIED TYPE: ICD-10-CM

## 2021-08-03 DIAGNOSIS — E78.5 HYPERLIPIDEMIA, UNSPECIFIED HYPERLIPIDEMIA TYPE: ICD-10-CM

## 2021-08-03 PROCEDURE — 99999 PR PBB SHADOW E&M-EST. PATIENT-LVL III: ICD-10-PCS | Mod: PBBFAC,GC,, | Performed by: STUDENT IN AN ORGANIZED HEALTH CARE EDUCATION/TRAINING PROGRAM

## 2021-08-03 PROCEDURE — 99213 PR OFFICE/OUTPT VISIT, EST, LEVL III, 20-29 MIN: ICD-10-PCS | Mod: S$PBB,GC,, | Performed by: STUDENT IN AN ORGANIZED HEALTH CARE EDUCATION/TRAINING PROGRAM

## 2021-08-03 PROCEDURE — 99999 PR PBB SHADOW E&M-EST. PATIENT-LVL III: CPT | Mod: PBBFAC,GC,, | Performed by: STUDENT IN AN ORGANIZED HEALTH CARE EDUCATION/TRAINING PROGRAM

## 2021-08-03 PROCEDURE — 99213 OFFICE O/P EST LOW 20 MIN: CPT | Mod: S$PBB,GC,, | Performed by: STUDENT IN AN ORGANIZED HEALTH CARE EDUCATION/TRAINING PROGRAM

## 2021-08-03 PROCEDURE — 99213 OFFICE O/P EST LOW 20 MIN: CPT | Mod: PBBFAC | Performed by: STUDENT IN AN ORGANIZED HEALTH CARE EDUCATION/TRAINING PROGRAM

## 2021-08-03 RX ORDER — HYDROCHLOROTHIAZIDE 25 MG/1
25 TABLET ORAL DAILY
Qty: 90 TABLET | Refills: 3 | Status: SHIPPED | OUTPATIENT
Start: 2021-08-03 | End: 2021-11-03 | Stop reason: SDUPTHER

## 2021-08-03 RX ORDER — FLUTICASONE PROPIONATE 50 MCG
2 SPRAY, SUSPENSION (ML) NASAL DAILY
Qty: 9.9 ML | Refills: 3 | Status: SHIPPED | OUTPATIENT
Start: 2021-08-03 | End: 2021-09-29 | Stop reason: SDUPTHER

## 2021-08-03 RX ORDER — PRAVASTATIN SODIUM 40 MG/1
40 TABLET ORAL NIGHTLY
Qty: 90 TABLET | Refills: 3 | Status: SHIPPED | OUTPATIENT
Start: 2021-08-03 | End: 2021-11-03 | Stop reason: SDUPTHER

## 2021-08-03 RX ORDER — AMITRIPTYLINE HYDROCHLORIDE 25 MG/1
25 TABLET, FILM COATED ORAL NIGHTLY PRN
Qty: 30 TABLET | Refills: 3 | Status: SHIPPED | OUTPATIENT
Start: 2021-08-03 | End: 2021-09-29 | Stop reason: SDUPTHER

## 2021-09-29 DIAGNOSIS — M30.1: ICD-10-CM

## 2021-09-29 DIAGNOSIS — D72.18: ICD-10-CM

## 2021-09-29 RX ORDER — FLUTICASONE PROPIONATE 50 MCG
2 SPRAY, SUSPENSION (ML) NASAL DAILY
Qty: 9.9 ML | Refills: 3 | Status: SHIPPED | OUTPATIENT
Start: 2021-09-29 | End: 2021-11-03 | Stop reason: SDUPTHER

## 2021-09-29 RX ORDER — AMITRIPTYLINE HYDROCHLORIDE 25 MG/1
25 TABLET, FILM COATED ORAL NIGHTLY PRN
Qty: 30 TABLET | Refills: 3 | Status: SHIPPED | OUTPATIENT
Start: 2021-09-29 | End: 2021-11-03

## 2021-11-03 ENCOUNTER — OFFICE VISIT (OUTPATIENT)
Dept: INTERNAL MEDICINE | Facility: CLINIC | Age: 76
End: 2021-11-03
Payer: MEDICARE

## 2021-11-03 VITALS — DIASTOLIC BLOOD PRESSURE: 50 MMHG | BODY MASS INDEX: 26.71 KG/M2 | WEIGHT: 160.5 LBS | SYSTOLIC BLOOD PRESSURE: 120 MMHG

## 2021-11-03 DIAGNOSIS — I10 ESSENTIAL HYPERTENSION: Primary | ICD-10-CM

## 2021-11-03 DIAGNOSIS — D72.18: ICD-10-CM

## 2021-11-03 DIAGNOSIS — M30.1: ICD-10-CM

## 2021-11-03 DIAGNOSIS — E78.5 HYPERLIPIDEMIA, UNSPECIFIED HYPERLIPIDEMIA TYPE: ICD-10-CM

## 2021-11-03 PROCEDURE — 99212 OFFICE O/P EST SF 10 MIN: CPT | Mod: PBBFAC | Performed by: STUDENT IN AN ORGANIZED HEALTH CARE EDUCATION/TRAINING PROGRAM

## 2021-11-03 PROCEDURE — 99999 PR PBB SHADOW E&M-EST. PATIENT-LVL II: ICD-10-PCS | Mod: PBBFAC,GC,, | Performed by: STUDENT IN AN ORGANIZED HEALTH CARE EDUCATION/TRAINING PROGRAM

## 2021-11-03 PROCEDURE — 99213 OFFICE O/P EST LOW 20 MIN: CPT | Mod: S$PBB,GC,, | Performed by: STUDENT IN AN ORGANIZED HEALTH CARE EDUCATION/TRAINING PROGRAM

## 2021-11-03 PROCEDURE — 99213 PR OFFICE/OUTPT VISIT, EST, LEVL III, 20-29 MIN: ICD-10-PCS | Mod: S$PBB,GC,, | Performed by: STUDENT IN AN ORGANIZED HEALTH CARE EDUCATION/TRAINING PROGRAM

## 2021-11-03 PROCEDURE — 99999 PR PBB SHADOW E&M-EST. PATIENT-LVL II: CPT | Mod: PBBFAC,GC,, | Performed by: STUDENT IN AN ORGANIZED HEALTH CARE EDUCATION/TRAINING PROGRAM

## 2021-11-03 RX ORDER — HYDROCHLOROTHIAZIDE 25 MG/1
25 TABLET ORAL DAILY
Qty: 90 TABLET | Refills: 3 | Status: SHIPPED | OUTPATIENT
Start: 2021-11-03 | End: 2022-09-20 | Stop reason: SDUPTHER

## 2021-11-03 RX ORDER — PRAVASTATIN SODIUM 40 MG/1
40 TABLET ORAL NIGHTLY
Qty: 90 TABLET | Refills: 3 | Status: SHIPPED | OUTPATIENT
Start: 2021-11-03 | End: 2022-12-27 | Stop reason: SDUPTHER

## 2021-11-03 RX ORDER — FLUTICASONE PROPIONATE 50 MCG
2 SPRAY, SUSPENSION (ML) NASAL DAILY
Qty: 9.9 ML | Refills: 3 | Status: SHIPPED | OUTPATIENT
Start: 2021-11-03 | End: 2023-11-07 | Stop reason: SDUPTHER

## 2022-03-24 ENCOUNTER — TELEPHONE (OUTPATIENT)
Dept: INTERNAL MEDICINE | Facility: CLINIC | Age: 77
End: 2022-03-24
Payer: MEDICARE

## 2022-03-24 NOTE — TELEPHONE ENCOUNTER
----- Message from Ciara Marks sent at 7/15/2021  1:43 PM CDT -----  Doctor appointment and lab have been scheduled.  Please link lab orders to the lab appointment.  Date of doctor appointment:    Date of lab appointment:    Physical or F/U:   Comments:     REMINDER to : ## delete this from the message after reading! ##     1) Physical Established Patient: NO LABS FIRST for Giebene, Luz, Slade, Derek, Bob and Dorsey.  2) The lab appointment must be at least 3 days from now to allow time for the order to be entered and linked to the appointment.   3) Lab appointment should be scheduled at least 3 days before the doctor appointment.    Kel is going to establish with Dr Cody

## 2022-06-22 ENCOUNTER — TELEPHONE (OUTPATIENT)
Dept: INTERNAL MEDICINE | Facility: CLINIC | Age: 77
End: 2022-06-22
Payer: MEDICARE

## 2022-06-22 NOTE — TELEPHONE ENCOUNTER
----- Message from Joyceadalgisa Ramsey sent at 6/22/2022  9:52 AM CDT -----  Contact: [phone]  Requesting an RX refill or new RX.  Is this a refill or new RX: refill  RX name and strength (copy/paste from chart):  hydroCHLOROthiazide (HYDRODIURIL) 25 MG tablet  Is this a 30 day or 90 day RX: 90  Pharmacy name and phone # (copy/paste from chart):    [facility]/pharmacy #[id number] [address]  [address] [address]  Phone: [phone] Fax: [phone]     The doctors have asked that we provide their patients with the following 2 reminders -- prescription refills can take up to 72 hours, and a friendly reminder that in the future you can use your [facility] account to request refills: yes     Requesting an RX refill or new RX.  Is this a refill or new RX: refill  RX name and strength (copy/paste from chart): fluticasone propionate (FLONASE) 50 mcg/actuation nasal spray   Is this a 30 day or 90 day RX: 90  Pharmacy name and phone # (copy/paste from chart):   [id number] [address] [facility]  [address] [facility] [address]  Phone: [phone] Fax: [phone]      The doctors have asked that we provide their patients with the following 2 reminders -- prescription refills can take up to 72 hours, and a friendly reminder that in the future you can use your [facility] account to request refills: yes

## 2022-09-20 DIAGNOSIS — I10 ESSENTIAL HYPERTENSION: ICD-10-CM

## 2022-09-20 NOTE — TELEPHONE ENCOUNTER
----- Message from Betsy Gatica LPN sent at 9/20/2022 10:27 AM CDT -----  Contact: [phone]   doing fellowship in Nephrology, please call pt.  ----- Message -----  From: Tato Goyal  Sent: 9/20/2022  10:03 AM CDT  To: [facility] Staff    Requesting an RX refill or new RX.  Is this a refill or new RX: refill  RX name and strength (copy/paste from chart):  hydroCHLOROthiazide (HYDRODIURIL) 25 MG tablet  Is this a 30 day or 90 day RX:   Pharmacy name and phone # (copy/paste from chart):    [id number] [address] [facility]  [address] [facility] [address]  Phone: [phone] Fax: [phone]      The doctors have asked that we provide their patients with the following 2 reminders -- prescription refills can take up to 72 hours, and a friendly reminder that in the future you can use your [facility] account to request refills: call back

## 2022-09-21 NOTE — TELEPHONE ENCOUNTER
Spoke w/ pt and notified her of Dr. Cody's departure. Assisted w/ scheduling appt next week to [facility] care w/ new provider, and also notified her that the staff provider will assist w/ refill. Pt understood.

## 2022-09-21 NOTE — TELEPHONE ENCOUNTER
Hi, please call the patient and explain that the patient's resident doctor has graduated, Dr. Fei Cody MD.  Please offer an appt with a new resident doctor.  I will send in one prescription of this medicine, but patient needs to be seen for any further prescriptions.  Also, I am not sure [facility] [address] [facility]    Thank you, Tulio Martinez

## 2022-09-22 RX ORDER — HYDROCHLOROTHIAZIDE 25 MG/1
25 TABLET ORAL DAILY
Qty: 90 TABLET | Refills: 0 | Status: SHIPPED | OUTPATIENT
Start: 2022-09-22 | End: 2022-12-27 | Stop reason: SDUPTHER

## 2022-09-22 RX ORDER — HYDROCHLOROTHIAZIDE 25 MG/1
25 TABLET ORAL DAILY
Qty: 90 TABLET | Refills: 0 | Status: SHIPPED | OUTPATIENT
Start: 2022-09-22 | End: 2022-09-22 | Stop reason: SDUPTHER

## 2022-09-26 ENCOUNTER — OFFICE VISIT (OUTPATIENT)
Dept: INTERNAL MEDICINE | Facility: CLINIC | Age: 77
End: 2022-09-26
Payer: MEDICARE

## 2022-09-26 VITALS
DIASTOLIC BLOOD PRESSURE: 64 MMHG | BODY MASS INDEX: 26 KG/M2 | HEIGHT: 65 IN | SYSTOLIC BLOOD PRESSURE: 108 MMHG | WEIGHT: 156.06 LBS | OXYGEN SATURATION: 98 %

## 2022-09-26 DIAGNOSIS — I10 ESSENTIAL HYPERTENSION: ICD-10-CM

## 2022-09-26 DIAGNOSIS — E78.5 HYPERLIPIDEMIA, UNSPECIFIED HYPERLIPIDEMIA TYPE: ICD-10-CM

## 2022-09-26 DIAGNOSIS — Z12.31 SCREENING MAMMOGRAM FOR BREAST CANCER: Primary | ICD-10-CM

## 2022-09-26 DIAGNOSIS — G50.0 TRIGEMINAL NEURALGIA: ICD-10-CM

## 2022-09-26 DIAGNOSIS — M85.88 OTHER SPECIFIED DISORDERS OF BONE DENSITY AND STRUCTURE, OTHER SITE: ICD-10-CM

## 2022-09-26 PROCEDURE — 99213 PR OFFICE/OUTPT VISIT, EST, LEVL III, 20-29 MIN: ICD-10-PCS | Mod: S$PBB,GC,,

## 2022-09-26 PROCEDURE — 99999 PR PBB SHADOW E&M-EST. PATIENT-LVL III: CPT | Mod: PBBFAC,GC,,

## 2022-09-26 PROCEDURE — 99213 OFFICE O/P EST LOW 20 MIN: CPT | Mod: PBBFAC

## 2022-09-26 PROCEDURE — 99213 OFFICE O/P EST LOW 20 MIN: CPT | Mod: S$PBB,GC,,

## 2022-09-26 PROCEDURE — 99999 PR PBB SHADOW E&M-EST. PATIENT-LVL III: ICD-10-PCS | Mod: PBBFAC,GC,,

## 2022-09-26 RX ORDER — GABAPENTIN 100 MG/1
100 CAPSULE ORAL 3 TIMES DAILY
Qty: 90 CAPSULE | Refills: 2 | Status: SHIPPED | OUTPATIENT
Start: 2022-09-26 | End: 2023-11-07

## 2022-09-26 NOTE — PROGRESS NOTES
"  Internal Medicine Resident Clinic   Clinic Note    Patient Name: Kel Cui   YOB: 1945     SUBJECTIVE:     Chief Complaint: Establishing care    History of Present Illness:  Ms. Kel Cui is a 76 y.o. female w/PMH of hypertension, hyperlipidemia, and trigeminal neuralgia who presents for follow-up of her chronic conditions and to establish care with me. Patient previously followed with Dr. Fei Cody and last saw him in 11/2021. She reports that she has had no issues and no new complaints since she last saw her PCP.    In terms of her chronic issues:  Hypertension - She says she does not measure her BP at home, though does have a BP cuff. Today her BP is 108/64. She is still taking HCTZ 25mg daily.    Hyperlipidemia - She has been taking pravastatin 40mg daily without issues. Has not had lipid screening done since 2/2021. Reports trying to eat a healthy and balanced diet.    Trigeminal neuralgia - She reports still having problems with her neuralgia in her Right lower face and jaw; she says that she is still having episodic severe nerve pain, with 3-4 episodes that can last from a few minutes to an hour consisting of 10/10 pain. The episodes of pain do not occur every day and she can go days without any pain. This nerve pain started in 2017, and since then she has not taken any medications to deal with the pain. In the chart, there is mention of oxcarbazepine, but that she did not tolerate the side effects, as well as gabapentin and amitriptyline, which she says she never took either of due to fear of possible side effects. She reports she recently was told by a dentist that she had a "projectile" located in her Right lower jaw. However she said she went to see a specialist (possibly oromaxillofacial surgeon, but unclear) subsequently who said there was no intervention to do for her. She says she will be seeking a second opinion from another dentist soon.    Review of Systems "   Constitutional:  Negative for fever and malaise/fatigue.   HENT:  Negative for congestion and sinus pain.         Episodic severe neuralgia on Right side of face.   Eyes:  Negative for double vision and photophobia.   Respiratory:  Negative for cough and shortness of breath.    Cardiovascular:  Negative for chest pain and claudication.   Gastrointestinal:  Negative for constipation and vomiting.   Genitourinary:  Negative for urgency.   Musculoskeletal:  Negative for myalgias and neck pain.   Skin:  Negative for itching and rash.   Neurological:  Negative for weakness and headaches.   Psychiatric/Behavioral:  Negative for depression. The patient is not nervous/anxious.      PAST HISTORY:     Past Medical History:   Diagnosis Date    HLD (hyperlipidemia)     Hypertension     Trigeminal neuralgia of right side of face        Past Surgical History:   Procedure Laterality Date    CATARACT EXTRACTION W/  INTRAOCULAR LENS IMPLANT Right 2015    CATARACT EXTRACTION W/  INTRAOCULAR LENS IMPLANT Left     COLONOSCOPY N/A 9/18/2018    Procedure: COLONOSCOPY;  Surgeon: DANIE Jara MD;  Location: [address];  Service: Endoscopy;  Laterality: N/A;  Pt reports colonoscopy 1 year ago at [facility] which removed 5 polyps and was informed she needed a 1 year follow up colonoscopy.    HYSTERECTOMY  1977       Family History   Problem Relation Age of Onset    Cancer Mother         39    Cancer Sister         Pancreatic    Cancer Brother         Prostate    Diabetes Maternal Aunt     Diabetes Maternal Uncle     Diabetes Paternal Aunt     Diabetes Paternal Uncle        Social History     Socioeconomic History    Marital status:    Occupational History    Occupation:     Occupation: Retired 2005   Tobacco Use    Smoking status: Never    Smokeless tobacco: Never   Substance and Sexual Activity    Alcohol use: No    Drug use: No    Sexual activity: Never       MEDICATIONS & ALLERGIES:     Current Outpatient  "Medications on File Prior to Visit   Medication Sig    blood pressure test kit-large Kit Health maintenance    calcium-vitamin D3 (OS-MITCHELL 500 + D3) 500 mg(1,250mg) -200 unit per tablet Take 1 tablet by mouth 2 (two) times daily with meals.    diclofenac sodium (VOLTAREN) 1 % Gel Apply twice daily as needed    FLUAD QUAD 2020-21,65Y UP,,PF, 60 mcg (15 mcg x 4)/0.5 mL Syrg ADM 0.5ML IM UTD    fluticasone propionate (FLONASE) 50 mcg/actuation nasal spray 2 sprays (100 mcg total) by Each Nostril route once daily. For further refills, please make appointment w/ doctor.    FLUZONE HIGH-DOSE 2018-19, PF, 180 mcg/0.5 mL vaccine ADMINISTER 0.5ML IN THE MUSCLE AS DIRECTED    hydroCHLOROthiazide (HYDRODIURIL) 25 MG tablet Take 1 tablet (25 mg total) by mouth once daily. For further refills, please make appointment w/ doctor.    pravastatin (PRAVACHOL) 40 MG tablet Take 1 tablet (40 mg total) by mouth every evening. For further refills, please make appointment w/ doctor.     No current facility-administered medications on file prior to visit.       Review of patient's allergies indicates:  No Known Allergies    OBJECTIVE:     Vital Signs:  Vitals:    09/26/22 1359   BP: 108/64   BP Location: Right arm   Patient Position: Sitting   BP Method: Medium (Manual)   SpO2: 98%   Weight: 70.8 kg (156 lb 1.4 oz)   Height: 5' 5" (1.651 m)       Body mass index is 25.97 kg/m².     Physical Exam  Constitutional:       General: She is not in acute distress.     Appearance: Normal appearance. She is not ill-appearing.   HENT:      Head: Normocephalic and atraumatic.      Nose: Nose normal. No congestion.      Mouth/Throat:      Mouth: Mucous membranes are moist.      Pharynx: Oropharynx is clear.   Eyes:      Extraocular Movements: Extraocular movements intact.      Pupils: Pupils are equal, round, and reactive to light.   Cardiovascular:      Rate and Rhythm: Normal rate and regular rhythm.      Pulses: Normal pulses.      Heart sounds: " Normal heart sounds.   Pulmonary:      Effort: Pulmonary effort is normal.      Breath sounds: Normal breath sounds.   Abdominal:      General: Abdomen is flat.      Palpations: Abdomen is soft.   Musculoskeletal:         General: No tenderness. Normal range of motion.      Cervical back: Normal range of motion and neck supple.   Skin:     General: Skin is warm and dry.   Neurological:      General: No focal deficit present.      Mental Status: She is alert and oriented to person, place, and time.   Psychiatric:         Mood and Affect: Mood normal.         Behavior: Behavior normal.       Laboratory  Lab Results   Component Value Date    WBC 6.90 11/16/2020    HGB 12.9 11/16/2020    HCT 41.6 11/16/2020     (H) 11/16/2020     11/16/2020     BMP  Lab Results   Component Value Date     02/20/2021    K 3.5 02/20/2021     02/20/2021    CO2 30 (H) 02/20/2021    BUN 14 02/20/2021    CREATININE 1.0 02/20/2021    CALCIUM 9.5 02/20/2021    ANIONGAP 8 02/20/2021    ESTGFRAFRICA >60.0 02/20/2021    EGFRNONAA 54.9 (A) 02/20/2021     No results found for: INR, PROTIME  Lab Results   Component Value Date    HGBA1C 5.1 11/16/2020         Health Maintenance Due   Topic Date Due    DEXA Scan  09/13/2020    Shingles Vaccine (3 of 3) 07/24/2021    Influenza Vaccine (1) 09/01/2022    COVID-19 Vaccine (2 - Pfizer series) 09/15/2022         ASSESSMENT & PLAN:   Ms. Kel Cui is a 76 y.o. female w/PMH of HTN, HLD, trigeminal neuralgia who presents to [facility] care and f/u of chronic conditions.   - Discussed starting gabapentin and uptitrating dosage slowly; had lengthy discussion with patient about risk-benefit of gabapentin for her neuralgia, given her concern of side effects. Pt ultimately amenable and so will initiate at 100mg once a day, and check in with her in 2 weeks to see how she is doing, and will go up on the dose as tolerated. Patient expressed understanding of the plan. Otherwise no changes  made to medications, and will schedule patient for mammogram and DEXA scan both of which she is due for. Routine blood work scheduled as well which patient says she will get on Thursday.    Screening mammogram for breast cancer  -     Mammo Digital Screening Bilat; Future; Expected date: 09/26/2022    Trigeminal neuralgia  -     gabapentin (NEURONTIN) 100 MG capsule; Take 1 capsule (100 mg total) by mouth 3 (three) times daily. Start by taking just one capsule per day before increasing your dosage.  Dispense: 90 capsule; Refill: 2    Hyperlipidemia, unspecified hyperlipidemia type  -     LIPID PANEL; Future; Expected date: 09/26/2022    Essential hypertension  -     CBC W/ AUTO DIFFERENTIAL; Future; Expected date: 09/26/2022  -     COMPREHENSIVE METABOLIC PANEL; Future; Expected date: 09/26/2022    Other specified disorders of bone density and structure, other site  -     DXA Bone Density Spine And Hip; Future; Expected date: 09/26/2022      RTC in 1 year or sooner as needed.    Patient was discussed with and seen together with Dr. Scott.      Robin Reilly MD  Internal Medicine, PGY-1  [facility]

## 2022-09-28 ENCOUNTER — LAB VISIT (OUTPATIENT)
Dept: LAB | Facility: HOSPITAL | Age: 77
End: 2022-09-28
Payer: MEDICARE

## 2022-09-28 DIAGNOSIS — I10 ESSENTIAL HYPERTENSION: ICD-10-CM

## 2022-09-28 DIAGNOSIS — E78.5 HYPERLIPIDEMIA, UNSPECIFIED HYPERLIPIDEMIA TYPE: ICD-10-CM

## 2022-09-28 LAB
ALBUMIN SERPL BCP-MCNC: 3.9 G/DL (ref 3.5–5.2)
ALP SERPL-CCNC: 87 U/L (ref 55–135)
ALT SERPL W/O P-5'-P-CCNC: 18 U/L (ref 10–44)
ANION GAP SERPL CALC-SCNC: 10 MMOL/L (ref 8–16)
AST SERPL-CCNC: 23 U/L (ref 10–40)
BASOPHILS # BLD AUTO: 0.05 K/UL (ref 0–0.2)
BASOPHILS NFR BLD: 0.6 % (ref 0–1.9)
BILIRUB SERPL-MCNC: 0.5 MG/DL (ref 0.1–1)
BUN SERPL-MCNC: 13 MG/DL (ref 8–23)
CALCIUM SERPL-MCNC: 9.8 MG/DL (ref 8.7–10.5)
CHLORIDE SERPL-SCNC: 103 MMOL/L (ref 95–110)
CHOLEST SERPL-MCNC: 201 MG/DL (ref 120–199)
CHOLEST/HDLC SERPL: 3.8 {RATIO} (ref 2–5)
CO2 SERPL-SCNC: 30 MMOL/L (ref 23–29)
CREAT SERPL-MCNC: 0.9 MG/DL (ref 0.5–1.4)
DIFFERENTIAL METHOD: ABNORMAL
EOSINOPHIL # BLD AUTO: 0.1 K/UL (ref 0–0.5)
EOSINOPHIL NFR BLD: 1 % (ref 0–8)
ERYTHROCYTE [DISTWIDTH] IN BLOOD BY AUTOMATED COUNT: 12.8 % (ref 11.5–14.5)
EST. GFR  (NO RACE VARIABLE): >60 ML/MIN/1.73 M^2
GLUCOSE SERPL-MCNC: 93 MG/DL (ref 70–110)
HCT VFR BLD AUTO: 38.8 % (ref 37–48.5)
HDLC SERPL-MCNC: 53 MG/DL (ref 40–75)
HDLC SERPL: 26.4 % (ref 20–50)
HGB BLD-MCNC: 12.7 G/DL (ref 12–16)
IMM GRANULOCYTES # BLD AUTO: 0.02 K/UL (ref 0–0.04)
IMM GRANULOCYTES NFR BLD AUTO: 0.2 % (ref 0–0.5)
LDLC SERPL CALC-MCNC: 126.6 MG/DL (ref 63–159)
LYMPHOCYTES # BLD AUTO: 1.9 K/UL (ref 1–4.8)
LYMPHOCYTES NFR BLD: 23.8 % (ref 18–48)
MCH RBC QN AUTO: 32.9 PG (ref 27–31)
MCHC RBC AUTO-ENTMCNC: 32.7 G/DL (ref 32–36)
MCV RBC AUTO: 101 FL (ref 82–98)
MONOCYTES # BLD AUTO: 0.5 K/UL (ref 0.3–1)
MONOCYTES NFR BLD: 5.5 % (ref 4–15)
NEUTROPHILS # BLD AUTO: 5.6 K/UL (ref 1.8–7.7)
NEUTROPHILS NFR BLD: 68.9 % (ref 38–73)
NONHDLC SERPL-MCNC: 148 MG/DL
NRBC BLD-RTO: 0 /100 WBC
PLATELET # BLD AUTO: 223 K/UL (ref 150–450)
PMV BLD AUTO: 10.4 FL (ref 9.2–12.9)
POTASSIUM SERPL-SCNC: 3.4 MMOL/L (ref 3.5–5.1)
PROT SERPL-MCNC: 7.6 G/DL (ref 6–8.4)
RBC # BLD AUTO: 3.86 M/UL (ref 4–5.4)
SODIUM SERPL-SCNC: 143 MMOL/L (ref 136–145)
TRIGL SERPL-MCNC: 107 MG/DL (ref 30–150)
WBC # BLD AUTO: 8.11 K/UL (ref 3.9–12.7)

## 2022-09-28 PROCEDURE — 36415 COLL VENOUS BLD VENIPUNCTURE: CPT

## 2022-09-28 PROCEDURE — 85025 COMPLETE CBC W/AUTO DIFF WBC: CPT

## 2022-09-28 PROCEDURE — 80053 COMPREHEN METABOLIC PANEL: CPT

## 2022-09-28 PROCEDURE — 80061 LIPID PANEL: CPT

## 2022-10-07 NOTE — PROGRESS NOTES
I have reviewed and concur with the resident's history, physical, assessment, and plan for patient Kel Cui I have personally interviewed and examined the patient.  Pt. With symptoms consistent with idiopathic trigeminal neuralgia although reports history that may also suggest trigeminal neuropathic pain secondary to possible trigeminal nerve injury and is scheduled for follow up with maxofacial specialist.  Agree with start slow and go slow trial of gabapentin and close follow up.

## 2022-11-02 ENCOUNTER — HOSPITAL ENCOUNTER (OUTPATIENT)
Dept: RADIOLOGY | Facility: CLINIC | Age: 77
Discharge: HOME | End: 2022-11-02
Payer: MEDICARE

## 2022-11-02 DIAGNOSIS — M85.88 OTHER SPECIFIED DISORDERS OF BONE DENSITY AND STRUCTURE, OTHER SITE: ICD-10-CM

## 2022-11-02 PROCEDURE — 77080 DEXA BONE DENSITY SPINE HIP: ICD-10-PCS | Mod: 26,,, | Performed by: INTERNAL MEDICINE

## 2022-11-02 PROCEDURE — 77080 DXA BONE DENSITY AXIAL: CPT | Mod: 26,,, | Performed by: INTERNAL MEDICINE

## 2022-11-02 PROCEDURE — 77080 DXA BONE DENSITY AXIAL: CPT | Mod: TC

## 2022-11-10 ENCOUNTER — HOSPITAL ENCOUNTER (OUTPATIENT)
Dept: RADIOLOGY | Facility: HOSPITAL | Age: 77
Discharge: HOME | End: 2022-11-10
Payer: MEDICARE

## 2022-11-10 DIAGNOSIS — Z12.31 SCREENING MAMMOGRAM FOR BREAST CANCER: ICD-10-CM

## 2022-11-10 PROCEDURE — 77067 MAMMO DIGITAL SCREENING BILAT WITH TOMO: ICD-10-PCS | Mod: 26,,, | Performed by: RADIOLOGY

## 2022-11-10 PROCEDURE — 77067 SCR MAMMO BI INCL CAD: CPT | Mod: TC

## 2022-11-10 PROCEDURE — 77063 MAMMO DIGITAL SCREENING BILAT WITH TOMO: ICD-10-PCS | Mod: 26,,, | Performed by: RADIOLOGY

## 2022-11-10 PROCEDURE — 77067 SCR MAMMO BI INCL CAD: CPT | Mod: 26,,, | Performed by: RADIOLOGY

## 2022-11-10 PROCEDURE — 77063 BREAST TOMOSYNTHESIS BI: CPT | Mod: TC

## 2022-11-10 PROCEDURE — 77063 BREAST TOMOSYNTHESIS BI: CPT | Mod: 26,,, | Performed by: RADIOLOGY

## 2022-12-27 DIAGNOSIS — I10 ESSENTIAL HYPERTENSION: ICD-10-CM

## 2022-12-27 DIAGNOSIS — E78.5 HYPERLIPIDEMIA, UNSPECIFIED HYPERLIPIDEMIA TYPE: ICD-10-CM

## 2022-12-27 RX ORDER — PRAVASTATIN SODIUM 40 MG/1
40 TABLET ORAL NIGHTLY
Qty: 90 TABLET | Refills: 3 | Status: SHIPPED | OUTPATIENT
Start: 2022-12-27 | End: 2024-02-29

## 2022-12-27 RX ORDER — HYDROCHLOROTHIAZIDE 25 MG/1
25 TABLET ORAL DAILY
Qty: 90 TABLET | Refills: 0 | Status: SHIPPED | OUTPATIENT
Start: 2022-12-27 | End: 2023-04-03 | Stop reason: SDUPTHER

## 2022-12-27 NOTE — TELEPHONE ENCOUNTER
----- Message from Farhana Jones sent at 12/27/2022 10:10 AM CST -----  Contact: self/[phone]  Requesting an RX refill or new RX.  Is this a refill or new RX: refill  RX name and strength (copy/paste from chart):  hydroCHLOROthiazide (HYDRODIURIL) 25 MG tablet 90 tablet 0 9/22/2022  No  Sig - Route: Take 1 tablet (25 mg total) by mouth once daily. For further refills, please make appointment w/ doctor.   Is this a 30 day or 90 day RX: 90  Pharmacy name and phone # (copy/paste from chart):   [facility]/pharmacy #[id number] - [place], [place] - [address], [place] [address]  Phone: [phone] Fax: [phone]  The doctors have asked that we provide their patients with the following 2 reminders -- prescription refills can take up to 72 hours, and a friendly reminder that in the future you can use your [facility] account to request refills: call back      Requesting an RX refill or new RX.  Is this a refill or new RX: refill  RX name and strength (copy/paste from chart): pravastatin (PRAVACHOL) 40 MG tablet 90 tablet 3 11/3/2021 10/29/2022 No  Sig - Route: Take 1 tablet (40 mg total) by mouth every evening. For further refills, please make appointment w/ doctor.  Is this a 30 day or 90 day RX: 90  Pharmacy name and phone # (copy/paste from chart):    [id number] [place], [place] - [address] [place] [address]  Phone: [phone] Fax: [phone]  The doctors have asked that we provide their patients with the following 2 reminders -- prescription refills can take up to 72 hours, and a friendly reminder that in the future you can use your [facility] account to request refills: call back    Pt sees Robin Reilly MD    Please advise

## 2023-01-04 ENCOUNTER — TELEPHONE (OUTPATIENT)
Dept: INTERNAL MEDICINE | Facility: CLINIC | Age: 78
End: 2023-01-04
Payer: MEDICARE

## 2023-01-04 NOTE — TELEPHONE ENCOUNTER
----- Message from Farhana Jones sent at 12/27/2022 10:10 AM CST -----  Contact: self/[phone]  Requesting an RX refill or new RX.  Is this a refill or new RX: refill  RX name and strength (copy/paste from chart):  hydroCHLOROthiazide (HYDRODIURIL) 25 MG tablet 90 tablet 0 9/22/2022  No  Sig - Route: Take 1 tablet (25 mg total) by mouth once daily. For further refills, please make appointment w/ doctor.   Is this a 30 day or 90 day RX: 90  Pharmacy name and phone # (copy/paste from chart):   [facility]/pharmacy #[id number] - [place], [place] - [address], [place] [address]  Phone: [phone] Fax: [phone]  The doctors have asked that we provide their patients with the following 2 reminders -- prescription refills can take up to 72 hours, and a friendly reminder that in the future you can use your [facility] account to request refills: call back      Requesting an RX refill or new RX.  Is this a refill or new RX: refill  RX name and strength (copy/paste from chart): pravastatin (PRAVACHOL) 40 MG tablet 90 tablet 3 11/3/2021 10/29/2022 No  Sig - Route: Take 1 tablet (40 mg total) by mouth every evening. For further refills, please make appointment w/ doctor.  Is this a 30 day or 90 day RX: 90  Pharmacy name and phone # (copy/paste from chart):    [id number] [place] - [address], [address] [place] [address]  Phone: [phone] Fax: [phone]  The doctors have asked that we provide their patients with the following 2 reminders -- prescription refills can take up to 72 hours, and a friendly reminder that in the future you can use your [facility] account to request refills: call back    Pt sees Robin Reilly MD    Please advise

## 2023-01-04 NOTE — TELEPHONE ENCOUNTER
----- Message from Farhana Jones sent at 12/27/2022 10:10 AM CST -----  Contact: self/[phone]  Requesting an RX refill or new RX.  Is this a refill or new RX: refill  RX name and strength (copy/paste from chart):  hydroCHLOROthiazide (HYDRODIURIL) 25 MG tablet 90 tablet 0 9/22/2022  No  Sig - Route: Take 1 tablet (25 mg total) by mouth once daily. For further refills, please make appointment w/ doctor.   Is this a 30 day or 90 day RX: 90  Pharmacy name and phone # (copy/paste from chart):   [facility]/pharmacy #[id number] - [place], [place] - [address], [place] [address]  Phone: [phone] Fax: [phone]  The doctors have asked that we provide their patients with the following 2 reminders -- prescription refills can take up to 72 hours, and a friendly reminder that in the future you can use your [facility] account to request refills: call back      Requesting an RX refill or new RX.  Is this a refill or new RX: refill  RX name and strength (copy/paste from chart): pravastatin (PRAVACHOL) 40 MG tablet 90 tablet 3 11/3/2021 10/29/2022 No  Sig - Route: Take 1 tablet (40 mg total) by mouth every evening. For further refills, please make appointment w/ doctor.  Is this a 30 day or 90 day RX: 90  Pharmacy name and phone # (copy/paste from chart):    [id number] [place] - [address], [address] [place] [address]  Phone: [phone] Fax: [phone]  The doctors have asked that we provide their patients with the following 2 reminders -- prescription refills can take up to 72 hours, and a friendly reminder that in the future you can use your [facility] account to request refills: call back    Pt sees Robin Reilly MD    Please advise

## 2023-01-04 NOTE — TELEPHONE ENCOUNTER
----- Message from Farhana Jones sent at 12/27/2022 10:10 AM CST -----  Contact: self/[phone]  Requesting an RX refill or new RX.  Is this a refill or new RX: refill  RX name and strength (copy/paste from chart):  hydroCHLOROthiazide (HYDRODIURIL) 25 MG tablet 90 tablet 0 9/22/2022  No  Sig - Route: Take 1 tablet (25 mg total) by mouth once daily. For further refills, please make appointment w/ doctor.   Is this a 30 day or 90 day RX: 90  Pharmacy name and phone # (copy/paste from chart):   [facility]/pharmacy #[id number] - [place], [place] - [address], [place] [address]  Phone: [phone] Fax: [phone]  The doctors have asked that we provide their patients with the following 2 reminders -- prescription refills can take up to 72 hours, and a friendly reminder that in the future you can use your [facility] account to request refills: call back      Requesting an RX refill or new RX.  Is this a refill or new RX: refill  RX name and strength (copy/paste from chart): pravastatin (PRAVACHOL) 40 MG tablet 90 tablet 3 11/3/2021 10/29/2022 No  Sig - Route: Take 1 tablet (40 mg total) by mouth every evening. For further refills, please make appointment w/ doctor.  Is this a 30 day or 90 day RX: 90  Pharmacy name and phone # (copy/paste from chart):    [id number] [place] - [address], [address] [place] [address]  Phone: [phone] Fax: [phone]  The doctors have asked that we provide their patients with the following 2 reminders -- prescription refills can take up to 72 hours, and a friendly reminder that in the future you can use your [facility] account to request refills: call back    Pt sees Robin Reilly MD    Please advise

## 2023-03-01 ENCOUNTER — OFFICE VISIT (OUTPATIENT)
Dept: DERMATOLOGY | Facility: CLINIC | Age: 78
End: 2023-03-01
Payer: MEDICARE

## 2023-03-01 DIAGNOSIS — L82.1 SEBORRHEIC KERATOSES: ICD-10-CM

## 2023-03-01 DIAGNOSIS — L81.8 IDIOPATHIC GUTTATE HYPOMELANOSIS: Primary | ICD-10-CM

## 2023-03-01 PROCEDURE — 99212 OFFICE O/P EST SF 10 MIN: CPT | Mod: PBBFAC | Performed by: STUDENT IN AN ORGANIZED HEALTH CARE EDUCATION/TRAINING PROGRAM

## 2023-03-01 PROCEDURE — 99999 PR PBB SHADOW E&M-EST. PATIENT-LVL II: ICD-10-PCS | Mod: PBBFAC,,, | Performed by: STUDENT IN AN ORGANIZED HEALTH CARE EDUCATION/TRAINING PROGRAM

## 2023-03-01 PROCEDURE — 99203 PR OFFICE/OUTPT VISIT, NEW, LEVL III, 30-44 MIN: ICD-10-PCS | Mod: S$PBB,,, | Performed by: STUDENT IN AN ORGANIZED HEALTH CARE EDUCATION/TRAINING PROGRAM

## 2023-03-01 PROCEDURE — 99999 PR PBB SHADOW E&M-EST. PATIENT-LVL II: CPT | Mod: PBBFAC,,, | Performed by: STUDENT IN AN ORGANIZED HEALTH CARE EDUCATION/TRAINING PROGRAM

## 2023-03-01 PROCEDURE — 99203 OFFICE O/P NEW LOW 30 MIN: CPT | Mod: S$PBB,,, | Performed by: STUDENT IN AN ORGANIZED HEALTH CARE EDUCATION/TRAINING PROGRAM

## 2023-03-01 NOTE — PROGRESS NOTES
Subjective:       Patient ID:  Kel Cui is a 77 y.o. female who presents for   Chief Complaint   Patient presents with    Spot     Patient with new complaint of lesion(s)  Location: right back bra line  Duration: many years  Symptoms: itching  Relieving factors/Previous treatments: none    Patient with new complaint of lesion(s)  Location: left thigh  Duration: many years  Symptoms: none  Relieving factors/Previous treatments: none    Patient with new complaint of lesion(s)  Location: spots on calves  Duration: many years   Symptoms: none   Relieving factors/Previous treatments: none              Review of Systems   Hematologic/Lymphatic: Does not bruise/bleed easily.      Objective:    Physical Exam   Constitutional: She appears well-developed and well-nourished. No distress.   Neurological: She is alert and oriented to person, place, and time. She is not disoriented.   Psychiatric: She has a normal mood and affect.   Skin:   Areas Examined (abnormalities noted in diagram):   Back Inspection Performed  RLE Inspected  LLE Inspection Performed            Diagram Legend     Erythematous scaling macule/papule c/w actinic keratosis       Vascular papule c/w angioma      Pigmented verrucoid papule/plaque c/w seborrheic keratosis      Yellow umbilicated papule c/w sebaceous hyperplasia      Irregularly shaped tan macule c/w lentigo     1-2 mm smooth white papules consistent with Milia      Movable subcutaneous cyst with punctum c/w epidermal inclusion cyst      Subcutaneous movable cyst c/w pilar cyst      Firm pink to brown papule c/w dermatofibroma      Pedunculated fleshy papule(s) c/w skin tag(s)      Evenly pigmented macule c/w junctional nevus     Mildly variegated pigmented, slightly irregular-bordered macule c/w mildly atypical nevus      Flesh colored to evenly pigmented papule c/w intradermal nevus       Pink pearly papule/plaque c/w basal cell carcinoma      Erythematous hyperkeratotic cursted plaque  c/w SCC      Surgical scar with no sign of skin cancer recurrence      Open and closed comedones      Inflammatory papules and pustules      Verrucoid papule consistent consistent with wart     Erythematous eczematous patches and plaques     Dystrophic onycholytic nail with subungual debris c/w onychomycosis     Umbilicated papule    Erythematous-base heme-crusted tan verrucoid plaque consistent with inflamed seborrheic keratosis     Erythematous Silvery Scaling Plaque c/w Psoriasis     See annotation      Assessment / Plan:        Idiopathic guttate hypomelanosis--legs  Reassurance given to patient. No treatment is necessary.     Can use daily photoprotection with sunscreen    Seborrheic keratoses--left thigh and back  Reassurance given to patient. No treatment is necessary.   Treatment of benign, asymptomatic lesions may be considered cosmetic.             Follow up if symptoms worsen or fail to improve.

## 2023-04-03 DIAGNOSIS — I10 ESSENTIAL HYPERTENSION: ICD-10-CM

## 2023-04-03 RX ORDER — HYDROCHLOROTHIAZIDE 25 MG/1
25 TABLET ORAL DAILY
Qty: 90 TABLET | Refills: 1 | Status: SHIPPED | OUTPATIENT
Start: 2023-04-03 | End: 2023-07-05 | Stop reason: SDUPTHER

## 2023-04-03 NOTE — TELEPHONE ENCOUNTER
----- Message from Yolis Hernandez sent at 4/3/2023 11:47 AM CDT -----  Contact: [phone]  Requesting an RX refill or new RX.  Is this a refill or new RX: refill  RX name and strength (copy/paste from chart):  hydroCHLOROthiazide (HYDRODIURIL) 25 MG tablet  Is this a 30 day or 90 day RX: 90  Pharmacy name and phone # (copy/paste from chart):    [id number] - [place], [place] - [address]  [address]  [place] [address]  Phone: [phone] Fax: [phone]  The doctors have asked that we provide their patients with the following 2 reminders -- prescription refills can take up to 72 hours, and a friendly reminder that in the future you can use your [facility] account to request refills: yes

## 2023-07-05 DIAGNOSIS — I10 ESSENTIAL HYPERTENSION: ICD-10-CM

## 2023-07-05 RX ORDER — HYDROCHLOROTHIAZIDE 25 MG/1
25 TABLET ORAL DAILY
Qty: 90 TABLET | Refills: 1 | Status: SHIPPED | OUTPATIENT
Start: 2023-07-05 | End: 2023-11-07 | Stop reason: SDUPTHER

## 2023-07-05 NOTE — TELEPHONE ENCOUNTER
----- Message from Tato Goyal sent at 7/5/2023 12:01 PM CDT -----  Contact: [phone]  Requesting an RX refill or new RX.  Is this a refill or new RX: refill  RX name and strength (copy/paste from chart):  hydroCHLOROthiazide (HYDRODIURIL) 25 MG tablet  Is this a 30 day or 90 day RX: 90  Pharmacy name and phone # (copy/paste from chart):    [id number] - [place], [place] - [address]  [address]  [place] [address]  Phone: [phone] Fax: [phone]  The doctors have asked that we provide their patients with the following 2 reminders -- prescription refills can take up to 72 hours, and a friendly reminder that in the future you can use your [facility] account to request refills: call back

## 2023-11-07 ENCOUNTER — LAB VISIT (OUTPATIENT)
Dept: LAB | Facility: HOSPITAL | Age: 78
End: 2023-11-07
Payer: MEDICARE

## 2023-11-07 ENCOUNTER — OFFICE VISIT (OUTPATIENT)
Dept: INTERNAL MEDICINE | Facility: CLINIC | Age: 78
End: 2023-11-07
Payer: MEDICARE

## 2023-11-07 VITALS
HEIGHT: 65 IN | WEIGHT: 148.13 LBS | HEART RATE: 60 BPM | DIASTOLIC BLOOD PRESSURE: 60 MMHG | BODY MASS INDEX: 24.68 KG/M2 | SYSTOLIC BLOOD PRESSURE: 104 MMHG

## 2023-11-07 DIAGNOSIS — E07.9 THYROID DISEASE: ICD-10-CM

## 2023-11-07 DIAGNOSIS — G50.0 TRIGEMINAL NEURALGIA: ICD-10-CM

## 2023-11-07 DIAGNOSIS — R73.03 PREDIABETES: ICD-10-CM

## 2023-11-07 DIAGNOSIS — E78.5 DYSLIPIDEMIA: ICD-10-CM

## 2023-11-07 DIAGNOSIS — Z00.00 ANNUAL PHYSICAL EXAM: ICD-10-CM

## 2023-11-07 DIAGNOSIS — I10 ESSENTIAL HYPERTENSION: ICD-10-CM

## 2023-11-07 DIAGNOSIS — Z00.00 ANNUAL PHYSICAL EXAM: Primary | ICD-10-CM

## 2023-11-07 DIAGNOSIS — D64.9 ANEMIA, UNSPECIFIED TYPE: ICD-10-CM

## 2023-11-07 DIAGNOSIS — Z12.31 ENCOUNTER FOR SCREENING MAMMOGRAM FOR MALIGNANT NEOPLASM OF BREAST: ICD-10-CM

## 2023-11-07 LAB
ALBUMIN SERPL BCP-MCNC: 3.9 G/DL (ref 3.5–5.2)
ALP SERPL-CCNC: 80 U/L (ref 55–135)
ALT SERPL W/O P-5'-P-CCNC: 17 U/L (ref 10–44)
ANION GAP SERPL CALC-SCNC: 10 MMOL/L (ref 8–16)
AST SERPL-CCNC: 20 U/L (ref 10–40)
BASOPHILS # BLD AUTO: 0.05 K/UL (ref 0–0.2)
BASOPHILS NFR BLD: 0.7 % (ref 0–1.9)
BILIRUB SERPL-MCNC: 0.4 MG/DL (ref 0.1–1)
BUN SERPL-MCNC: 11 MG/DL (ref 8–23)
CALCIUM SERPL-MCNC: 10.2 MG/DL (ref 8.7–10.5)
CHLORIDE SERPL-SCNC: 105 MMOL/L (ref 95–110)
CHOLEST SERPL-MCNC: 194 MG/DL (ref 120–199)
CHOLEST/HDLC SERPL: 3.7 {RATIO} (ref 2–5)
CO2 SERPL-SCNC: 29 MMOL/L (ref 23–29)
CREAT SERPL-MCNC: 0.8 MG/DL (ref 0.5–1.4)
DIFFERENTIAL METHOD: ABNORMAL
EOSINOPHIL # BLD AUTO: 0.1 K/UL (ref 0–0.5)
EOSINOPHIL NFR BLD: 0.9 % (ref 0–8)
ERYTHROCYTE [DISTWIDTH] IN BLOOD BY AUTOMATED COUNT: 12.8 % (ref 11.5–14.5)
EST. GFR  (NO RACE VARIABLE): >60 ML/MIN/1.73 M^2
ESTIMATED AVG GLUCOSE: 97 MG/DL (ref 68–131)
GLUCOSE SERPL-MCNC: 89 MG/DL (ref 70–110)
HBA1C MFR BLD: 5 % (ref 4–5.6)
HCT VFR BLD AUTO: 40.2 % (ref 37–48.5)
HDLC SERPL-MCNC: 52 MG/DL (ref 40–75)
HDLC SERPL: 26.8 % (ref 20–50)
HGB BLD-MCNC: 13 G/DL (ref 12–16)
IMM GRANULOCYTES # BLD AUTO: 0.01 K/UL (ref 0–0.04)
IMM GRANULOCYTES NFR BLD AUTO: 0.1 % (ref 0–0.5)
LDLC SERPL CALC-MCNC: 116.6 MG/DL (ref 63–159)
LYMPHOCYTES # BLD AUTO: 2.3 K/UL (ref 1–4.8)
LYMPHOCYTES NFR BLD: 29.8 % (ref 18–48)
MCH RBC QN AUTO: 32.3 PG (ref 27–31)
MCHC RBC AUTO-ENTMCNC: 32.3 G/DL (ref 32–36)
MCV RBC AUTO: 100 FL (ref 82–98)
MONOCYTES # BLD AUTO: 0.5 K/UL (ref 0.3–1)
MONOCYTES NFR BLD: 6.9 % (ref 4–15)
NEUTROPHILS # BLD AUTO: 4.7 K/UL (ref 1.8–7.7)
NEUTROPHILS NFR BLD: 61.6 % (ref 38–73)
NONHDLC SERPL-MCNC: 142 MG/DL
NRBC BLD-RTO: 0 /100 WBC
PLATELET # BLD AUTO: 249 K/UL (ref 150–450)
PMV BLD AUTO: 10.7 FL (ref 9.2–12.9)
POTASSIUM SERPL-SCNC: 3.9 MMOL/L (ref 3.5–5.1)
PROT SERPL-MCNC: 7.7 G/DL (ref 6–8.4)
RBC # BLD AUTO: 4.02 M/UL (ref 4–5.4)
SODIUM SERPL-SCNC: 144 MMOL/L (ref 136–145)
TRIGL SERPL-MCNC: 127 MG/DL (ref 30–150)
TSH SERPL DL<=0.005 MIU/L-ACNC: 1.01 UIU/ML (ref 0.4–4)
WBC # BLD AUTO: 7.55 K/UL (ref 3.9–12.7)

## 2023-11-07 PROCEDURE — 99999 PR PBB SHADOW E&M-EST. PATIENT-LVL III: ICD-10-PCS | Mod: PBBFAC,GC,,

## 2023-11-07 PROCEDURE — 80053 COMPREHEN METABOLIC PANEL: CPT

## 2023-11-07 PROCEDURE — 85025 COMPLETE CBC W/AUTO DIFF WBC: CPT

## 2023-11-07 PROCEDURE — 84443 ASSAY THYROID STIM HORMONE: CPT

## 2023-11-07 PROCEDURE — 99213 OFFICE O/P EST LOW 20 MIN: CPT | Mod: S$PBB,GC,,

## 2023-11-07 PROCEDURE — 36415 COLL VENOUS BLD VENIPUNCTURE: CPT

## 2023-11-07 PROCEDURE — 99213 PR OFFICE/OUTPT VISIT, EST, LEVL III, 20-29 MIN: ICD-10-PCS | Mod: S$PBB,GC,,

## 2023-11-07 PROCEDURE — 99999 PR PBB SHADOW E&M-EST. PATIENT-LVL III: CPT | Mod: PBBFAC,GC,,

## 2023-11-07 PROCEDURE — 80061 LIPID PANEL: CPT

## 2023-11-07 PROCEDURE — 83036 HEMOGLOBIN GLYCOSYLATED A1C: CPT

## 2023-11-07 PROCEDURE — 99213 OFFICE O/P EST LOW 20 MIN: CPT | Mod: PBBFAC

## 2023-11-07 RX ORDER — HYDROCHLOROTHIAZIDE 12.5 MG/1
12.5 TABLET ORAL DAILY
Qty: 90 TABLET | Refills: 3 | Status: SHIPPED | OUTPATIENT
Start: 2023-11-07

## 2023-11-07 RX ORDER — DULOXETIN HYDROCHLORIDE 30 MG/1
30 CAPSULE, DELAYED RELEASE ORAL DAILY
Qty: 30 CAPSULE | Refills: 1 | Status: SHIPPED | OUTPATIENT
Start: 2023-11-07 | End: 2024-01-06

## 2023-11-07 RX ORDER — FLUTICASONE PROPIONATE 50 MCG
2 SPRAY, SUSPENSION (ML) NASAL DAILY
Qty: 9.9 ML | Refills: 3 | Status: SHIPPED | OUTPATIENT
Start: 2023-11-07 | End: 2024-02-21 | Stop reason: SDUPTHER

## 2023-11-07 NOTE — PROGRESS NOTES
"[facility]   Clinic Note    Patient Name: Kel Cui   YOB: 1945     SUBJECTIVE:     Chief Complaint: Annual exam    History of Present Illness:  Ms. Kel Cui is a 78 y.o. female with PMH of hypertension, hyperlipidemia, and trigeminal neuralgia who presents for annual and follow-up of her chronic conditions. Patient last seen by me on 9/26/22. No significant new issues since that visit.     In terms of her chronic issues:  Hypertension - She has a BP cuff but is still not regularly measuring her BP. Today her BP is 104/60. She is still taking HCTZ 25mg daily. No symptoms of feeling faint or lightheaded.     Hyperlipidemia - She has been taking pravastatin 40mg daily without issues. Last year's lipid panel still not ideal, but patient wanted to continue lifestyle modification.     Trigeminal neuralgia - Per my last note: "She reports still having problems with her neuralgia in her Right lower face and jaw; she says that she is still having episodic severe nerve pain, with 3-4 episodes that can last from a few minutes to an hour consisting of 10/10 pain. The episodes of pain do not occur every day and she can go days without any pain. This nerve pain started in 2017; in the chart, there is mention of oxcarbazepine, but that she did not tolerate the side effects, as well as gabapentin and amitriptyline, which she says she never took either of due to fear of possible side effects. She reports she was told by a dentist that she had a "projectile" located in her Right lower jaw. However she said she went to see a specialist (possibly oromaxillofacial surgeon, but unclear) subsequently who said there was no intervention to do for her."    On this visit, she is still having the same symptoms. The gabapentin 100 TID I had prescribed had brought some mild relief, but she still had pain and ultimately ran out of the prescription and did not inform me to refill. On further " questioning, she reports to me information that is concerning for delusional thinking: she believes that her previous dentist had intentionally implanted something in her gum to cause the pain, so that she would have to keep going back to the dentist's office. She tells me she called the [employer] and reported this to them. I told her this was very unlikely to be the case and that I did not think that was true, but she was insistent that it had to be. She is still in the process of finding a dentist's office to her preference.    Otherwise she has been doing well and does not seem to display other delusional or paranoid thought processes. ROS unremarkable otherwise. Has been gardening and continuing to attend [employer] services.      Review of Systems   Constitutional:  Negative for fever and malaise/fatigue.   HENT:  Negative for congestion and sinus pain.         Episodic severe neuralgia on Right side of face and in the Right molar area of her gums   Eyes:  Negative for double vision and photophobia.   Respiratory:  Negative for cough and shortness of breath.    Cardiovascular:  Negative for chest pain and claudication.   Gastrointestinal:  Negative for constipation and vomiting.   Genitourinary:  Negative for urgency.   Musculoskeletal:  Negative for myalgias and neck pain.   Skin:  Negative for itching and rash.   Neurological:  Negative for weakness and headaches.   Psychiatric/Behavioral:  Negative for depression. The patient is not nervous/anxious.         Delusional thinking       PAST HISTORY:     Past Medical History:   Diagnosis Date    HLD (hyperlipidemia)     Hypertension     Trigeminal neuralgia of right side of face        Past Surgical History:   Procedure Laterality Date    CATARACT EXTRACTION W/  INTRAOCULAR LENS IMPLANT Right 2015    CATARACT EXTRACTION W/  INTRAOCULAR LENS IMPLANT Left     COLONOSCOPY N/A 9/18/2018    Procedure: COLONOSCOPY;  Surgeon: DANIE Jara MD;  Location: [facility] ([address] [place] [address]);   Service: Endoscopy;  Laterality: N/A;  Pt reports colonoscopy 1 year ago at [facility] which removed 5 polyps and was informed she needed a 1 year follow up colonoscopy.    HYSTERECTOMY  1977       Family History   Problem Relation Age of Onset    Cancer Mother         39    Cancer Sister         Pancreatic    Cancer Brother         Prostate    Diabetes Maternal Aunt     Diabetes Maternal Uncle     Diabetes Paternal Aunt     Diabetes Paternal Uncle        Social History     Socioeconomic History    Marital status:    Occupational History    Occupation:     Occupation: Retired 2005   Tobacco Use    Smoking status: Never    Smokeless tobacco: Never   Substance and Sexual Activity    Alcohol use: No    Drug use: No    Sexual activity: Never       MEDICATIONS & ALLERGIES:     Current Outpatient Medications on File Prior to Visit   Medication Sig    blood pressure test kit-large Kit Health maintenance    calcium-vitamin D3 (OS-MITCHELL 500 + D3) 500 mg(1,250mg) -200 unit per tablet Take 1 tablet by mouth 2 (two) times daily with meals.    FLUAD QUAD 2020-21,65Y UP,,PF, 60 mcg (15 mcg x 4)/0.5 mL Syrg ADM 0.5ML IM UTD    FLUZONE HIGH-DOSE 2018-19, PF, 180 mcg/0.5 mL vaccine ADMINISTER 0.5ML IN THE MUSCLE AS DIRECTED    gabapentin (NEURONTIN) 100 MG capsule Take 1 capsule (100 mg total) by mouth 3 (three) times daily. Start by taking just one capsule per day before increasing your dosage.    pravastatin (PRAVACHOL) 40 MG tablet Take 1 tablet (40 mg total) by mouth every evening. For further refills, please make appointment w/ doctor.    [DISCONTINUED] fluticasone propionate (FLONASE) 50 mcg/actuation nasal spray 2 sprays (100 mcg total) by Each Nostril route once daily. For further refills, please make appointment w/ doctor.    [DISCONTINUED] hydroCHLOROthiazide (HYDRODIURIL) 25 MG tablet Take 1 tablet (25 mg total) by mouth once daily. For further refills, please make appointment w/ doctor.    diclofenac sodium  "(VOLTAREN) 1 % Gel Apply twice daily as needed     No current facility-administered medications on file prior to visit.       Review of patient's allergies indicates:  No Known Allergies    OBJECTIVE:     Vital Signs:  Vitals:    11/07/23 1347   BP: 104/60   BP Location: Right arm   Patient Position: Sitting   BP Method: Medium (Manual)   Pulse: 60   Weight: 67.2 kg (148 lb 2.4 oz)   Height: 5' 5" (1.651 m)       Body mass index is 24.65 kg/m².     Physical Exam  Constitutional:       General: She is not in acute distress.     Appearance: Normal appearance. She is not ill-appearing.   HENT:      Head: Normocephalic and atraumatic.      Nose: Nose normal. No congestion.      Mouth/Throat:      Mouth: Mucous membranes are moist.      Pharynx: Oropharynx is clear.   Eyes:      Extraocular Movements: Extraocular movements intact.      Pupils: Pupils are equal, round, and reactive to light.   Cardiovascular:      Rate and Rhythm: Normal rate and regular rhythm.      Pulses: Normal pulses.      Heart sounds: Normal heart sounds.   Pulmonary:      Effort: Pulmonary effort is normal.      Breath sounds: Normal breath sounds.   Abdominal:      General: Abdomen is flat.      Palpations: Abdomen is soft.   Musculoskeletal:         General: No tenderness. Normal range of motion.      Cervical back: Normal range of motion and neck supple.   Skin:     General: Skin is warm and dry.   Neurological:      General: No focal deficit present.      Mental Status: She is alert and oriented to person, place, and time.   Psychiatric:         Mood and Affect: Mood normal.         Behavior: Behavior normal.      Comments: Delusional thought content         Laboratory  Lab Results   Component Value Date    WBC 8.11 09/28/2022    HGB 12.7 09/28/2022    HCT 38.8 09/28/2022     (H) 09/28/2022     09/28/2022     BMP  Lab Results   Component Value Date     09/28/2022    K 3.4 (L) 09/28/2022     09/28/2022    CO2 30 (H) " "09/28/2022    BUN 13 09/28/2022    CREATININE 0.9 09/28/2022    CALCIUM 9.8 09/28/2022    ANIONGAP 10 09/28/2022    EGFRNORACEVR >60.0 09/28/2022     No results found for: "INR", "PROTIME"  Lab Results   Component Value Date    HGBA1C 5.1 11/16/2020         Health Maintenance Due   Topic Date Due    RSV Vaccine (Age 60+) (1 - 1-dose 60+ series) Never done    Shingles Vaccine (3 of 3) 07/24/2021    Hemoglobin A1c (Prediabetes)  11/16/2021    Colonoscopy  09/18/2023          HEALTH MAINTENANCE:   Immunizations:   Up to date     Cancer Screening:  Colonoscopy: Deferred indefinitely. Pt due for scope, but expressed desire to not undergo. I underwent SDM discussion and believe it is reasonable to discontinue screening at her age of 78.  Mammogram: Ordered.  DXA bone scan: Not due until 11/2024.    ASSESSMENT & PLAN:   Ms. Kel Cui is a 78 y.o. female w/PMH of HTN, HLD, trigeminal neuralgia who presents for 1 year follow-up.  -     Her chronic conditions are stable and generally well controlled. Regarding her BP, some concern for her borderline pressures. On HCTZ 25, but I would like to switch her to 12.5mg tablets, given her pressures. She does not regularly check her BP so I told her I would like her to start checking and keep a record to see where her pressures are.   - Still having neuropathic pain on the right side of her face. Uncertain origin. She expresses delusional thinking that her prior dentist had implanted something in her gums to cause the pain. Given her neuropathic pain and concerning thought processes, instead of resuming Gabapentin, may opt to trial Duloxetine at this time. Will check in with patient in 1-2 weeks.  - Routine labs ordered and MMG ordered.    Annual physical exam  -     Mammo Digital Screening Bilat w/ Joshua; Future; Expected date: 11/14/2023  -     CBC W/ AUTO DIFFERENTIAL; Future; Expected date: 11/07/2023  -     COMPREHENSIVE METABOLIC PANEL; Future; Expected date: 11/07/2023  -  "    LIPID PANEL; Future; Expected date: 11/07/2023  -     TSH; Future; Expected date: 11/07/2023  -     HEMOGLOBIN A1C; Future; Expected date: 11/07/2023  -     RSV, Bivalent, RSVPREF (ABRYSVO)    Essential hypertension  -     hydroCHLOROthiazide (HYDRODIURIL) 12.5 MG Tab; Take 1 tablet (12.5 mg total) by mouth once daily. For further refills, please make appointment w/ doctor.  Dispense: 90 tablet; Refill: 3    Trigeminal neuralgia  -     DULoxetine (CYMBALTA) 30 MG capsule; Take 1 capsule (30 mg total) by mouth once daily.  Dispense: 30 capsule; Refill: 1    Encounter for screening mammogram for malignant neoplasm of breast  -     Mammo Digital Screening Bilat w/ Joshua; Future; Expected date: 11/14/2023    Anemia, unspecified type  -     CBC W/ AUTO DIFFERENTIAL; Future; Expected date: 11/07/2023    Dyslipidemia  -     LIPID PANEL; Future; Expected date: 11/07/2023    Thyroid disease  -     TSH; Future; Expected date: 11/07/2023    Prediabetes  -     HEMOGLOBIN A1C; Future; Expected date: 11/07/2023    Other orders  -     fluticasone propionate (FLONASE) 50 mcg/actuation nasal spray; 2 sprays (100 mcg total) by Each Nostril route once daily.  Dispense: 9.9 mL; Refill: 3        Return to clinic in 1 year or sooner as needed.    Patient was discussed with Dr. Sonia Reilly MD  Internal Medicine, PGY-2  [facility]

## 2023-11-08 ENCOUNTER — TELEPHONE (OUTPATIENT)
Dept: INTERNAL MEDICINE | Facility: CLINIC | Age: 78
End: 2023-11-08
Payer: MEDICARE

## 2023-11-08 NOTE — TELEPHONE ENCOUNTER
Called patient to inform her of her labs. Stable. Cholesterol mildly elevated, but pt will continue lifestyle modification.

## 2023-12-15 ENCOUNTER — HOSPITAL ENCOUNTER (OUTPATIENT)
Dept: RADIOLOGY | Facility: HOSPITAL | Age: 78
Discharge: HOME | End: 2023-12-15
Payer: MEDICARE

## 2023-12-15 DIAGNOSIS — Z00.00 ANNUAL PHYSICAL EXAM: ICD-10-CM

## 2023-12-15 DIAGNOSIS — Z12.31 ENCOUNTER FOR SCREENING MAMMOGRAM FOR MALIGNANT NEOPLASM OF BREAST: ICD-10-CM

## 2023-12-15 PROCEDURE — 77063 MAMMO DIGITAL SCREENING BILAT WITH TOMO: ICD-10-PCS | Mod: 26,,, | Performed by: RADIOLOGY

## 2023-12-15 PROCEDURE — 77067 SCR MAMMO BI INCL CAD: CPT | Mod: 26,,, | Performed by: RADIOLOGY

## 2023-12-15 PROCEDURE — 77067 MAMMO DIGITAL SCREENING BILAT WITH TOMO: ICD-10-PCS | Mod: 26,,, | Performed by: RADIOLOGY

## 2023-12-15 PROCEDURE — 77063 BREAST TOMOSYNTHESIS BI: CPT | Mod: 26,,, | Performed by: RADIOLOGY

## 2023-12-15 PROCEDURE — 77067 SCR MAMMO BI INCL CAD: CPT | Mod: TC

## 2024-02-21 RX ORDER — FLUTICASONE PROPIONATE 50 MCG
2 SPRAY, SUSPENSION (ML) NASAL DAILY
Qty: 9.9 ML | Refills: 3 | Status: SHIPPED | OUTPATIENT
Start: 2024-02-21

## 2024-02-21 NOTE — TELEPHONE ENCOUNTER
----- Message from Shirley Bernard sent at 2/21/2024 11:53 AM CST -----  Contact: [phone]  Requesting an RX refill or new RX.  Is this a refill or new RX: refill  RX name and strength (copy/paste from chart): fluticasone propionate (FLONASE) 50 mcg/actuation nasal spray   Is this a 30 day or 90 day RX: 90  Pharmacy name and phone # (copy/paste from chart):        [id number] - [place], [place] - [address]  [address]  [place] [address]  Phone: [phone] Fax: [phone]

## 2024-02-29 ENCOUNTER — OFFICE VISIT (OUTPATIENT)
Dept: INTERNAL MEDICINE | Facility: CLINIC | Age: 79
End: 2024-02-29
Payer: MEDICARE

## 2024-02-29 VITALS
OXYGEN SATURATION: 98 % | SYSTOLIC BLOOD PRESSURE: 124 MMHG | WEIGHT: 153.25 LBS | HEIGHT: 65 IN | BODY MASS INDEX: 25.53 KG/M2 | DIASTOLIC BLOOD PRESSURE: 88 MMHG | HEART RATE: 50 BPM

## 2024-02-29 DIAGNOSIS — E78.5 HYPERLIPIDEMIA, UNSPECIFIED HYPERLIPIDEMIA TYPE: ICD-10-CM

## 2024-02-29 DIAGNOSIS — I10 ESSENTIAL HYPERTENSION: ICD-10-CM

## 2024-02-29 DIAGNOSIS — G89.29 CHRONIC PAIN OF RIGHT KNEE: ICD-10-CM

## 2024-02-29 DIAGNOSIS — Z00.00 ENCOUNTER FOR PREVENTIVE HEALTH EXAMINATION: Primary | ICD-10-CM

## 2024-02-29 DIAGNOSIS — Z99.89 DEPENDENCE ON OTHER ENABLING MACHINES AND DEVICES: ICD-10-CM

## 2024-02-29 DIAGNOSIS — G50.0 TRIGEMINAL NEURALGIA OF RIGHT SIDE OF FACE: ICD-10-CM

## 2024-02-29 DIAGNOSIS — M25.561 CHRONIC PAIN OF RIGHT KNEE: ICD-10-CM

## 2024-02-29 PROBLEM — Z12.11 SCREENING FOR COLON CANCER: Status: RESOLVED | Noted: 2018-09-18 | Resolved: 2024-02-29

## 2024-02-29 PROCEDURE — G0439 PPPS, SUBSEQ VISIT: HCPCS | Mod: ,,, | Performed by: NURSE PRACTITIONER

## 2024-02-29 PROCEDURE — 99215 OFFICE O/P EST HI 40 MIN: CPT | Mod: PBBFAC | Performed by: NURSE PRACTITIONER

## 2024-02-29 PROCEDURE — 99999 PR PBB SHADOW E&M-EST. PATIENT-LVL V: CPT | Mod: PBBFAC,,, | Performed by: NURSE PRACTITIONER

## 2024-02-29 NOTE — PATIENT INSTRUCTIONS
88Counseling and Referral of Other Preventative  (Italic type indicates deductible and co-insurance are waived)    Patient Name: Kel Cui  Today's Date: 2/29/2024    Health Maintenance         Date Due Completion Date    DEXA Scan 11/02/2024 11/2/2022    Hemoglobin A1c (Prediabetes) 11/07/2024 11/7/2023    TETANUS VACCINE 03/09/2027 3/9/2017    Lipid Panel 11/07/2028 11/7/2023          Orders Placed This Encounter   Procedures    Ambulatory referral/consult to Orthopedics     The following information is provided to all patients.  This information is to help you find resources for any of the problems found today that may be affecting your health:                  Living healthy guide: www.[facility].gov      Understanding Diabetes: www.diabetes.org      Eating healthy: www.cdc.gov/healthyweight      [facility] [place] home safety checklist: www.cdc.gov/steadi/patient.html      Agency on Aging: www.goea.[place].[facility]      Alcoholics anonymous (AA): www.aa.org      Physical Activity: www.shaw.nih.gov/mh5ppnx      Tobacco use: www.quitwithus[place].org

## 2024-02-29 NOTE — PROGRESS NOTES
"  Kel Cui presented for a  Medicare AWV and comprehensive Health Risk Assessment today. The following components were reviewed and updated:    Medical history  Family History  Social history  Allergies and Current Medications  Health Risk Assessment  Health Maintenance  Care Team         ** See Completed Assessments for Annual Wellness Visit within the encounter summary.**         The following assessments were completed:  Living Situation  CAGE  Depression Screening  Timed Get Up and Go  Whisper Test  Cognitive Function Screening    Nutrition Screening  ADL Screening  PAQ Screening      Opioid documentation:      Patient does not have a current opioid prescription.        Vitals:    02/29/24 1419   BP: 124/88   BP Location: Right arm   Patient Position: Sitting   BP Method: Small (Manual)   Pulse: (!) 50   SpO2: 98%   Weight: 69.5 kg (153 lb 3.5 oz)   Height: 5' 5" (1.651 m)     Body mass index is 25.5 kg/m².  Physical Exam  Vitals and nursing note reviewed.   Constitutional:       Appearance: She is well-developed.   HENT:      Head: Normocephalic.   Cardiovascular:      Rate and Rhythm: Normal rate and regular rhythm.      Heart sounds: Normal heart sounds. No murmur heard.  Pulmonary:      Effort: Pulmonary effort is normal.      Breath sounds: Normal breath sounds.   Abdominal:      General: Bowel sounds are normal.      Palpations: Abdomen is soft.   Musculoskeletal:         General: Normal range of motion.   Skin:     General: Skin is warm and dry.   Neurological:      General: No focal deficit present.      Mental Status: She is alert and oriented to person, place, and time.      Motor: No abnormal muscle tone.   Psychiatric:         Mood and Affect: Mood normal.               Diagnoses and health risks identified today and associated recommendations/orders:    1. Encounter for preventive health examination  Here for Health Risk Assessment/Annual Wellness Visit.  Health maintenance reviewed and " updated. Follow up in one year.     2. Essential hypertension  Chronic, stable on current medication. Followed by PCP.     3. Hyperlipidemia, unspecified hyperlipidemia type  Chronic, stable on current medication. Followed by PCP.     4. Chronic pain of right knee  Chronic, reports persistent pain. Agreeable to evaluation  - Ambulatory referral/consult to Orthopedics; Future    5. Trigeminal neuralgia of right side of face   Chronic, stable on current medications. Followed by PCP.     6. Dependence on other enabling machines and devices  Chronic, ambulates with cane due to knee pain, no reported falls. Followed by PCP.      Provided Isaacsherrya with a 5-10 year written screening schedule and personal prevention plan. Recommendations were developed using the USPSTF age appropriate recommendations. Education, counseling, and referrals were provided as needed. After Visit Summary printed and given to patient which includes a list of additional screenings\tests needed.    Follow up in about 36 weeks (around 11/7/2024).with PCP    Elsa Díaz NP

## 2024-03-12 ENCOUNTER — HOSPITAL ENCOUNTER (OUTPATIENT)
Dept: RADIOLOGY | Facility: HOSPITAL | Age: 79
Discharge: HOME | End: 2024-03-12
Attending: NURSE PRACTITIONER
Payer: MEDICARE

## 2024-03-12 ENCOUNTER — OFFICE VISIT (OUTPATIENT)
Dept: ORTHOPEDICS | Facility: CLINIC | Age: 79
End: 2024-03-12
Payer: MEDICARE

## 2024-03-12 VITALS — WEIGHT: 153.25 LBS | BODY MASS INDEX: 25.53 KG/M2 | HEIGHT: 65 IN

## 2024-03-12 DIAGNOSIS — G89.29 CHRONIC PAIN OF RIGHT KNEE: ICD-10-CM

## 2024-03-12 DIAGNOSIS — M25.561 CHRONIC PAIN OF RIGHT KNEE: ICD-10-CM

## 2024-03-12 DIAGNOSIS — M17.11 PRIMARY OSTEOARTHRITIS OF RIGHT KNEE: Primary | ICD-10-CM

## 2024-03-12 PROCEDURE — 73562 X-RAY EXAM OF KNEE 3: CPT | Mod: 26,59,LT, | Performed by: RADIOLOGY

## 2024-03-12 PROCEDURE — 20610 DRAIN/INJ JOINT/BURSA W/O US: CPT | Mod: S$PBB,RT,, | Performed by: NURSE PRACTITIONER

## 2024-03-12 PROCEDURE — 99999 PR PBB SHADOW E&M-EST. PATIENT-LVL III: CPT | Mod: PBBFAC,,, | Performed by: NURSE PRACTITIONER

## 2024-03-12 PROCEDURE — 99213 OFFICE O/P EST LOW 20 MIN: CPT | Mod: PBBFAC,25 | Performed by: NURSE PRACTITIONER

## 2024-03-12 PROCEDURE — 73562 X-RAY EXAM OF KNEE 3: CPT | Mod: TC,LT

## 2024-03-12 PROCEDURE — 73564 X-RAY EXAM KNEE 4 OR MORE: CPT | Mod: 26,RT,, | Performed by: RADIOLOGY

## 2024-03-12 PROCEDURE — 99999PBSHW PR PBB SHADOW TECHNICAL ONLY FILED TO HB: Mod: PBBFAC,,,

## 2024-03-12 PROCEDURE — 99203 OFFICE O/P NEW LOW 30 MIN: CPT | Mod: S$PBB,25,, | Performed by: NURSE PRACTITIONER

## 2024-03-12 PROCEDURE — 20610 DRAIN/INJ JOINT/BURSA W/O US: CPT | Mod: PBBFAC | Performed by: NURSE PRACTITIONER

## 2024-03-12 RX ORDER — TRIAMCINOLONE ACETONIDE 40 MG/ML
40 INJECTION, SUSPENSION INTRA-ARTICULAR; INTRAMUSCULAR
Status: DISCONTINUED | OUTPATIENT
Start: 2024-03-12 | End: 2024-03-12 | Stop reason: HOSPADM

## 2024-03-12 RX ORDER — LIDOCAINE HYDROCHLORIDE 10 MG/ML
4 INJECTION INFILTRATION; PERINEURAL
Status: DISCONTINUED | OUTPATIENT
Start: 2024-03-12 | End: 2024-03-12 | Stop reason: HOSPADM

## 2024-03-12 RX ADMIN — TRIAMCINOLONE ACETONIDE 40 MG: 40 INJECTION, SUSPENSION INTRA-ARTICULAR; INTRAMUSCULAR at 02:03

## 2024-03-12 RX ADMIN — LIDOCAINE HYDROCHLORIDE 4 ML: 10 INJECTION, SOLUTION INFILTRATION; PERINEURAL at 02:03

## 2024-03-12 NOTE — PROCEDURES
Large Joint Aspiration/Injection: R knee    Date/Time: 3/12/2024 2:00 PM    Performed by: Tobi Martin NP  Authorized by: Tobi Martin NP    Consent Done?:  Yes (Verbal)  Indications:  Arthritis and pain  Site marked: the procedure site was marked    Timeout: prior to procedure the correct patient, procedure, and site was verified      Local anesthesia used?: Yes    Local anesthetic:  Lidocaine spray    Details:  Needle Size:  22 G  Ultrasonic Guidance for needle placement?: No    Approach:  Anterolateral  Location:  Knee  Site:  R knee  Medications:  4 mL LIDOcaine HCL 10 mg/ml (1%) 10 mg/mL (1 %); 40 mg triamcinolone acetonide 40 mg/mL  Patient tolerance:  Patient tolerated the procedure well with no immediate complications

## 2024-03-12 NOTE — PROGRESS NOTES
SUBJECTIVE:     Chief Complaint & History of Present Illness:  Kel Cui is a New 78 y.o. year old female patient here with a history of constant right knee pain which started 7 years ago.  There is not a history of trauma.  The pain is located in the global aspect of the knee.  The pain is described as, 8/10.  There is not radiation.  There is not catching or locking.  Aggravating factors include walking.  Associated symptoms include none.  There is not numbness or tingling of the lower extremity.  There is not back pain. Previous treatments include Cymbalta and an unknown OTC medicine which have provided minimal relief.  There is not a history of previous injury or surgery to the knee.  The patient walks with a cane.     Review of patient's allergies indicates:  No Known Allergies      Current Outpatient Medications   Medication Sig Dispense Refill    blood pressure test kit-large Kit Health maintenance  0    calcium-vitamin D3 (OS-MITCHELL 500 + D3) 500 mg(1,250mg) -200 unit per tablet Take 1 tablet by mouth 2 (two) times daily with meals. (Patient taking differently: Take 1 tablet by mouth Daily.) 90 tablet 3    fluticasone propionate (FLONASE) 50 mcg/actuation nasal spray 2 sprays (100 mcg total) by Each Nostril route once daily. (Patient taking differently: 2 sprays by Each Nostril route daily as needed.) 9.9 mL 3    hydroCHLOROthiazide (HYDRODIURIL) 12.5 MG Tab Take 1 tablet (12.5 mg total) by mouth once daily. For further refills, please make appointment w/ doctor. 90 tablet 3    DULoxetine (CYMBALTA) 30 MG capsule Take 1 capsule (30 mg total) by mouth once daily. 30 capsule 1    gabapentin (NEURONTIN) 100 MG capsule Take 1 capsule (100 mg total) by mouth 3 (three) times daily. Start by taking just one capsule per day before increasing your dosage. (Patient not taking: Reported on 2/29/2024) 90 capsule 2    pravastatin (PRAVACHOL) 40 MG tablet Take 1 tablet (40 mg total) by mouth every evening. For  "further refills, please make appointment w/ doctor. 90 tablet 3     No current facility-administered medications for this visit.       Past Medical History:   Diagnosis Date    HLD (hyperlipidemia)     Hypertension     Trigeminal neuralgia of right side of face        Past Surgical History:   Procedure Laterality Date    CATARACT EXTRACTION W/  INTRAOCULAR LENS IMPLANT Right 2015    CATARACT EXTRACTION W/  INTRAOCULAR LENS IMPLANT Left     COLONOSCOPY N/A 9/18/2018    Procedure: COLONOSCOPY;  Surgeon: DANIE Jara MD;  Location: [facility] ([address] [place] [address]);  Service: Endoscopy;  Laterality: N/A;  Pt reports colonoscopy 1 year ago at [facility] which removed 5 polyps and was informed she needed a 1 year follow up colonoscopy.    HYSTERECTOMY  1977       Family History   Problem Relation Age of Onset    Cancer Mother         39    Cancer Sister         Pancreatic    Cancer Brother         Prostate    Diabetes Maternal Aunt     Diabetes Maternal Uncle     Diabetes Paternal Aunt     Diabetes Paternal Uncle          Review of Systems:  ROS:  Constitutional: no fever or chills  Eyes: no visual changes  ENT: no nasal congestion or sore throat  Respiratory: no cough or shortness of breath  Cardiovascular: no chest pain or palpitations  Gastrointestinal: no nausea or vomiting, tolerating diet  Genitourinary: no hematuria or dysuria  Integument/Breast: no rash or pruritis  Hematologic/Lymphatic: no easy bruising or lymphadenopathy  Musculoskeletal: positive for arthralgias, negative for muscle weakness and myalgias  Neurological: no seizures or tremors  Behavioral/Psych: no auditory or visual hallucinations  Endocrine: no heat or cold intolerance      OBJECTIVE:     PHYSICAL EXAM:  Vital Signs (Most Recent)  There were no vitals filed for this visit.  Height: 5' 5" (165.1 cm) Weight: 69.5 kg (153 lb 3.5 oz),   Estimated body mass index is 25.5 kg/m² as calculated from the following:    Height as of this encounter: 5' 5" (1.651 m).    " Weight as of this encounter: 69.5 kg (153 lb 3.5 oz).   General Appearance: Well nourished, well developed, in no acute distress.  HENT: Normal cephalic, oropharynx pink and moist  Eyes: PERRLA bilaterally and EOM x 4  Respiratory: Even and unlabored  Skin: Warm and Dry.   Psychiatric: AAO x 4, Mood & affect are normal.    right  Knee Exam:  No TTP to R knee  Knee Range of Motion:normal and full   Effusion:not significant  Condition of skin:intact  Location of tenderness: diffuse   Strength:5 of 5  Stability:  stable to testing, Lachman: stable, LCL: stable, MCL: stable, and PCL: stable  Varus /Valgus stress:  normal  Cecelia:   negative/negative    Left  Knee Exam:  Knee Range of Motion:normal   Effusion:none  Condition of skin:intact  Location of tenderness:None   Strength:5 of 5  Stability:  stable to testing, Lachman: stable, LCL: stable, MCL: stable, and PCL: stable  Varus /Valgus stress:  normal  Cecelia:   negative      Hip Examination:  Not completed    RADIOGRAPHS:  Joint spaces are narrowed with degenerative change on the lateral aspect of the joints bilaterally.  Patellofemoral degenerative change and prominent bony spurring is seen on the right with no joint effusion.  There is some bony spurring extending up into the quadriceps tendon.     All radiographs were personally reviewed by me.    ASSESSMENT/PLAN:       ICD-10-CM ICD-9-CM   1. Chronic pain of right knee  M25.561 719.46    G89.29 338.29       -Kel Cui presents to clinic today with c/c right knee pain for the past 7 years  - CSI injection done today. Pt was informed of risks and benefits of CSI injection, and is in agreement with procedure.  See procedure note.  - RTC PRN  -X-ray as above.  -Recommend RICE therapy.    Patient seen with the PA student, agree with their H&P.  78-year-old female with chronic right knee pain presents to Orthopedic [facility] for evaluation of her osteoarthritis.  Patient denies any recent trauma.  X-ray shows  significant osteoarthritis involving the knee.  Patient would like to have a steroid injection and see if this helps alleviate her symptoms.  She was offered physical therapy but declined.  Please see procedural note.

## 2024-10-03 DIAGNOSIS — E78.5 HYPERLIPIDEMIA, UNSPECIFIED HYPERLIPIDEMIA TYPE: ICD-10-CM

## 2024-10-03 RX ORDER — PRAVASTATIN SODIUM 40 MG/1
40 TABLET ORAL NIGHTLY
Qty: 90 TABLET | Refills: 3 | Status: SHIPPED | OUTPATIENT
Start: 2024-10-03 | End: 2025-09-28

## 2024-10-03 NOTE — TELEPHONE ENCOUNTER
----- Message from Ayaka sent at 10/2/2024  2:41 PM CDT -----  Contact: patient  [phone]  Requesting an RX refill or new RX.    Is this a refill or new RX:     RX name and strength pravastatin (PRAVACHOL) 40 MG tablet    Is this a 30 day or 90 day RX:     Pharmacy name and phone # (      [facility]/pharmacy #[id number] - [place], [place] - [address]  [address], [place] [address]  Phone: [phone] Fax: [phone]        The doctors have asked that we provide their patients with the following 2 reminders -- prescription refills can take up to 72 hours, and a friendly reminder that in the future you can use your [facility] account to request refills: yes

## 2024-11-11 ENCOUNTER — LAB VISIT (OUTPATIENT)
Dept: LAB | Facility: HOSPITAL | Age: 79
End: 2024-11-11
Payer: MEDICARE

## 2024-11-11 ENCOUNTER — OFFICE VISIT (OUTPATIENT)
Dept: INTERNAL MEDICINE | Facility: CLINIC | Age: 79
End: 2024-11-11
Payer: MEDICARE

## 2024-11-11 VITALS
DIASTOLIC BLOOD PRESSURE: 74 MMHG | BODY MASS INDEX: 24.68 KG/M2 | WEIGHT: 148.13 LBS | SYSTOLIC BLOOD PRESSURE: 122 MMHG | HEART RATE: 60 BPM | HEIGHT: 65 IN | OXYGEN SATURATION: 99 %

## 2024-11-11 DIAGNOSIS — R79.9 ABNORMAL FINDING OF BLOOD CHEMISTRY, UNSPECIFIED: ICD-10-CM

## 2024-11-11 DIAGNOSIS — R68.89 OTHER GENERAL SYMPTOMS AND SIGNS: ICD-10-CM

## 2024-11-11 DIAGNOSIS — M85.88 OTHER SPECIFIED DISORDERS OF BONE DENSITY AND STRUCTURE, OTHER SITE: ICD-10-CM

## 2024-11-11 DIAGNOSIS — G50.0 TRIGEMINAL NEURALGIA: ICD-10-CM

## 2024-11-11 DIAGNOSIS — Z00.00 ANNUAL PHYSICAL EXAM: Primary | ICD-10-CM

## 2024-11-11 DIAGNOSIS — M81.0 OSTEOPOROSIS, UNSPECIFIED OSTEOPOROSIS TYPE, UNSPECIFIED PATHOLOGICAL FRACTURE PRESENCE: ICD-10-CM

## 2024-11-11 DIAGNOSIS — M85.80 OSTEOPENIA, UNSPECIFIED LOCATION: ICD-10-CM

## 2024-11-11 DIAGNOSIS — Z00.00 ANNUAL PHYSICAL EXAM: ICD-10-CM

## 2024-11-11 LAB
ALBUMIN SERPL BCP-MCNC: 3.8 G/DL (ref 3.5–5.2)
ALP SERPL-CCNC: 103 U/L (ref 40–150)
ALT SERPL W/O P-5'-P-CCNC: 14 U/L (ref 10–44)
ANION GAP SERPL CALC-SCNC: 12 MMOL/L (ref 8–16)
AST SERPL-CCNC: 20 U/L (ref 10–40)
BASOPHILS # BLD AUTO: 0.05 K/UL (ref 0–0.2)
BASOPHILS NFR BLD: 0.6 % (ref 0–1.9)
BILIRUB SERPL-MCNC: 0.5 MG/DL (ref 0.1–1)
BUN SERPL-MCNC: 11 MG/DL (ref 8–23)
CALCIUM SERPL-MCNC: 9.9 MG/DL (ref 8.7–10.5)
CHLORIDE SERPL-SCNC: 104 MMOL/L (ref 95–110)
CHOLEST SERPL-MCNC: 206 MG/DL (ref 120–199)
CHOLEST/HDLC SERPL: 3.6 {RATIO} (ref 2–5)
CO2 SERPL-SCNC: 26 MMOL/L (ref 23–29)
CREAT SERPL-MCNC: 0.8 MG/DL (ref 0.5–1.4)
DIFFERENTIAL METHOD BLD: ABNORMAL
EOSINOPHIL # BLD AUTO: 0.1 K/UL (ref 0–0.5)
EOSINOPHIL NFR BLD: 1.1 % (ref 0–8)
ERYTHROCYTE [DISTWIDTH] IN BLOOD BY AUTOMATED COUNT: 12.6 % (ref 11.5–14.5)
EST. GFR  (NO RACE VARIABLE): >60 ML/MIN/1.73 M^2
GLUCOSE SERPL-MCNC: 85 MG/DL (ref 70–110)
HCT VFR BLD AUTO: 41.5 % (ref 37–48.5)
HDLC SERPL-MCNC: 58 MG/DL (ref 40–75)
HDLC SERPL: 28.2 % (ref 20–50)
HGB BLD-MCNC: 12.7 G/DL (ref 12–16)
IMM GRANULOCYTES # BLD AUTO: 0.04 K/UL (ref 0–0.04)
IMM GRANULOCYTES NFR BLD AUTO: 0.5 % (ref 0–0.5)
LDLC SERPL CALC-MCNC: 120.4 MG/DL (ref 63–159)
LYMPHOCYTES # BLD AUTO: 2.2 K/UL (ref 1–4.8)
LYMPHOCYTES NFR BLD: 24.6 % (ref 18–48)
MCH RBC QN AUTO: 31.5 PG (ref 27–31)
MCHC RBC AUTO-ENTMCNC: 30.6 G/DL (ref 32–36)
MCV RBC AUTO: 103 FL (ref 82–98)
MONOCYTES # BLD AUTO: 0.6 K/UL (ref 0.3–1)
MONOCYTES NFR BLD: 6.9 % (ref 4–15)
NEUTROPHILS # BLD AUTO: 5.9 K/UL (ref 1.8–7.7)
NEUTROPHILS NFR BLD: 66.3 % (ref 38–73)
NONHDLC SERPL-MCNC: 148 MG/DL
NRBC BLD-RTO: 0 /100 WBC
PLATELET # BLD AUTO: 234 K/UL (ref 150–450)
PMV BLD AUTO: 10.3 FL (ref 9.2–12.9)
POTASSIUM SERPL-SCNC: 3.6 MMOL/L (ref 3.5–5.1)
PROT SERPL-MCNC: 7.6 G/DL (ref 6–8.4)
RBC # BLD AUTO: 4.03 M/UL (ref 4–5.4)
SODIUM SERPL-SCNC: 142 MMOL/L (ref 136–145)
TRIGL SERPL-MCNC: 138 MG/DL (ref 30–150)
TSH SERPL DL<=0.005 MIU/L-ACNC: 1.27 UIU/ML (ref 0.4–4)
WBC # BLD AUTO: 8.87 K/UL (ref 3.9–12.7)

## 2024-11-11 PROCEDURE — 84443 ASSAY THYROID STIM HORMONE: CPT

## 2024-11-11 PROCEDURE — 99213 OFFICE O/P EST LOW 20 MIN: CPT | Mod: PBBFAC

## 2024-11-11 PROCEDURE — 83036 HEMOGLOBIN GLYCOSYLATED A1C: CPT

## 2024-11-11 PROCEDURE — 80061 LIPID PANEL: CPT

## 2024-11-11 PROCEDURE — 85025 COMPLETE CBC W/AUTO DIFF WBC: CPT

## 2024-11-11 PROCEDURE — 80053 COMPREHEN METABOLIC PANEL: CPT

## 2024-11-11 PROCEDURE — 99999 PR PBB SHADOW E&M-EST. PATIENT-LVL III: CPT | Mod: PBBFAC,GC,,

## 2024-11-11 PROCEDURE — 36415 COLL VENOUS BLD VENIPUNCTURE: CPT

## 2024-11-11 RX ORDER — DULOXETIN HYDROCHLORIDE 30 MG/1
30 CAPSULE, DELAYED RELEASE ORAL DAILY
Qty: 30 CAPSULE | Refills: 1 | Status: SHIPPED | OUTPATIENT
Start: 2024-11-11 | End: 2025-01-10

## 2024-11-11 NOTE — PROGRESS NOTES
"[facility]   Clinic Note    Patient Name: Kel Cui   YOB: 1945     SUBJECTIVE:     Chief Complaint: Annual    History of Present Illness:  Ms. Kel Cui is a 79 y.o. female with PMH of hypertension, hyperlipidemia, and osteoporosis who presents for annual and follow-up of her chronic conditions. Patient last seen by me on 11/7/23. No significant new issues since that visit.     In terms of her chronic issues:  Hypertension - Doing well since I lowered her HCTZ from 25 to 12.5 last year. Blood pressure in normal range.     Hyperlipidemia - She has still been taking pravastatin 40mg daily without issues. Last lipid panel was fine.    Osteoporosis - Stable. Had DEXA two years ago that showed osteopenia and not osteoporosis yet. Patient is taking calcium supplementation and eats dairy products. Due for repeat.     ?Trigeminal neuralgia/Right jaw pain - Per my last note: "She reports still having problems with her neuralgia in her Right lower face and jaw; she says that she is still having episodic severe nerve pain, with 3-4 episodes that can last from a few minutes to an hour consisting of 10/10 pain. The episodes of pain do not occur every day and she can go days without any pain. This nerve pain started in 2017; in the chart, there is mention of oxcarbazepine, but that she did not tolerate the side effects, as well as gabapentin and amitriptyline, which she says she never took either of due to fear of possible side effects. She reports she was told by a dentist that she had a "projectile" located in her Right lower jaw. However she said she went to see a specialist (possibly oromaxillofacial surgeon, but unclear) subsequently who said there was no intervention to do for her." On my last visit there was some concern for delusional thinking since she reported she suspected her last dentist had intentionally implanted something in her gums to cause her to have pain so " that she would continue to return.    Today she continues to endorse the same, neuropathic-sounding pain that has been largely unchanged since the last two years. I had trialed gabapentin without success and tried Cymbalta last year. She reports she had tried it for a while but then had stopped. She feels like it had helped somewhat but did not remember why she stopped taking it. She tells me she was provided a list of insurance-friendly dentists and that she has not yet reached out to any of them schedule an appointment yet. Not currently having the pain.    Otherwise her review of systems is unremarkable, but I note that her recall seems to be a little worse than prior.    Review of Systems   Constitutional:  Negative for chills and fever.   HENT:  Negative for congestion and sore throat.         Right jaw pain   Eyes:  Negative for double vision and photophobia.   Respiratory:  Negative for cough and shortness of breath.    Cardiovascular:  Negative for chest pain and palpitations.   Gastrointestinal:  Negative for abdominal pain and vomiting.   Genitourinary:  Negative for dysuria and urgency.   Musculoskeletal:  Negative for myalgias and neck pain.   Neurological:  Negative for dizziness and weakness.   Psychiatric/Behavioral:  Negative for depression. The patient is not nervous/anxious.        PAST HISTORY:     Past Medical History:   Diagnosis Date    HLD (hyperlipidemia)     Hypertension     Trigeminal neuralgia of right side of face        Past Surgical History:   Procedure Laterality Date    CATARACT EXTRACTION W/  INTRAOCULAR LENS IMPLANT Right 2015    CATARACT EXTRACTION W/  INTRAOCULAR LENS IMPLANT Left     COLONOSCOPY N/A 9/18/2018    Procedure: COLONOSCOPY;  Surgeon: DANIE Jara MD;  Location: [address]);  Service: Endoscopy;  Laterality: N/A;  Pt reports colonoscopy 1 year ago at [facility] which removed 5 polyps and was informed she needed a 1 year follow up colonoscopy.    HYSTERECTOMY  1977        Family History   Problem Relation Name Age of Onset    Cancer Mother          39    Cancer Sister 4         Pancreatic    Cancer Brother 4         Prostate    Diabetes Maternal Aunt      Diabetes Maternal Uncle      Diabetes Paternal Aunt      Diabetes Paternal Uncle         Social History     Socioeconomic History    Marital status:    Occupational History    Occupation:     Occupation: Retired 2005   Tobacco Use    Smoking status: Never    Smokeless tobacco: Never   Substance and Sexual Activity    Alcohol use: Yes     Comment: maybe one monthly    Drug use: No    Sexual activity: Never     Social Drivers of Health     Financial Resource Strain: Low Risk  (2/29/2024)    Overall Financial Resource Strain (CARDIA)     Difficulty of Paying Living Expenses: Not hard at all   Food Insecurity: No Food Insecurity (2/29/2024)    Hunger Vital Sign     Worried About Running Out of Food in the Last Year: Never true     Ran Out of Food in the Last Year: Never true   Transportation Needs: Unknown (2/29/2024)    PRAPARE - Transportation     Lack of Transportation (Non-Medical): No   Physical Activity: Insufficiently Active (2/29/2024)    Exercise Vital Sign     Days of Exercise per Week: 1 day     Minutes of Exercise per Session: 30 min   Stress: No Stress Concern Present (2/29/2024)    [employer] - Occupational Stress Questionnaire     Feeling of Stress : Not at all   Housing Stability: Unknown (2/29/2024)    Housing Stability Vital Sign     Unable to Pay for Housing in the Last Year: No     Unstable Housing in the Last Year: No       MEDICATIONS & ALLERGIES:     Current Outpatient Medications on File Prior to Visit   Medication Sig    blood pressure test kit-large Kit Health maintenance    calcium-vitamin D3 (OS-MITCHELL 500 + D3) 500 mg(1,250mg) -200 unit per tablet Take 1 tablet by mouth 2 (two) times daily with meals. (Patient taking differently: Take 1 tablet by mouth  "Daily.)    fluticasone propionate (FLONASE) 50 mcg/actuation nasal spray 2 sprays (100 mcg total) by Each Nostril route once daily. (Patient taking differently: 2 sprays by Each Nostril route daily as needed.)    gabapentin (NEURONTIN) 100 MG capsule Take 1 capsule (100 mg total) by mouth 3 (three) times daily. Start by taking just one capsule per day before increasing your dosage. (Patient not taking: Reported on 2/29/2024)    hydroCHLOROthiazide (HYDRODIURIL) 12.5 MG Tab Take 1 tablet (12.5 mg total) by mouth once daily. For further refills, please make appointment w/ doctor.    pravastatin (PRAVACHOL) 40 MG tablet Take 1 tablet (40 mg total) by mouth every evening. For further refills, please make appointment w/ doctor.    [DISCONTINUED] DULoxetine (CYMBALTA) 30 MG capsule Take 1 capsule (30 mg total) by mouth once daily.     No current facility-administered medications on file prior to visit.       Review of patient's allergies indicates:  No Known Allergies    OBJECTIVE:     Vital Signs:  Vitals:    11/11/24 1413   BP: 122/74   BP Location: Left arm   Patient Position: Sitting   Pulse: 60   SpO2: 99%   Weight: 67.2 kg (148 lb 2.4 oz)   Height: 5' 5" (1.651 m)       Body mass index is 24.65 kg/m².     Physical Exam  Vitals reviewed.   Constitutional:       Appearance: Normal appearance.   HENT:      Head: Normocephalic and atraumatic.   Eyes:      Extraocular Movements: Extraocular movements intact.      Conjunctiva/sclera: Conjunctivae normal.   Cardiovascular:      Rate and Rhythm: Normal rate and regular rhythm.      Pulses: Normal pulses.      Heart sounds: Normal heart sounds.   Pulmonary:      Effort: Pulmonary effort is normal. No respiratory distress.      Breath sounds: Normal breath sounds.   Abdominal:      General: Abdomen is flat.      Palpations: Abdomen is soft.      Tenderness: There is no abdominal tenderness.   Musculoskeletal:         General: No tenderness. Normal range of motion.      " "Cervical back: Normal range of motion.   Skin:     General: Skin is warm.      Findings: No rash.   Neurological:      Mental Status: She is alert and oriented to person, place, and time. Mental status is at baseline.   Psychiatric:         Mood and Affect: Mood normal.         Behavior: Behavior normal.           Laboratory  Lab Results   Component Value Date    WBC 7.55 11/07/2023    HGB 13.0 11/07/2023    HCT 40.2 11/07/2023     (H) 11/07/2023     11/07/2023     BMP  Lab Results   Component Value Date     11/07/2023    K 3.9 11/07/2023     11/07/2023    CO2 29 11/07/2023    BUN 11 11/07/2023    CREATININE 0.8 11/07/2023    CALCIUM 10.2 11/07/2023    ANIONGAP 10 11/07/2023    EGFRNORACEVR >60.0 11/07/2023     No results found for: "INR", "PROTIME"  Lab Results   Component Value Date    HGBA1C 5.0 11/07/2023         Health Maintenance Due   Topic Date Due    DEXA Scan  11/02/2024       ASSESSMENT & PLAN:   Ms. Kel Cui is a 79 y.o. female w/PMH of HTN, HLD, and osteopenia who presents for annual.  -     Doing overall OK. Still having her chronic jaw/gum pain that is of uncertain etiology. She still has not yet established with a dentist due to her previous bad experience with her last dentist (which I suspect might have been some kind of a delusion).  - Given what I felt was some worsening memory and with her ongoing reluctance to schedule a dentist appointment, I performed a MOCA on her and she scored 25/30 (see image attached) indicating only some mild cognitive impairment. She is largely still functional and independent though, so at this time I am not too concerned.  - No changes to her other medications. Will order routine bloodwork. Will order repeat DEXA to monitor her osteoporosis.    Annual physical exam  -     CBC Auto Differential; Future; Expected date: 11/11/2024  -     Comprehensive Metabolic Panel; Future; Expected date: 11/11/2024  -     Lipid Panel; Future; Expected " date: 11/11/2024  -     Hemoglobin A1C; Future; Expected date: 11/11/2024  -     TSH; Future; Expected date: 11/11/2024    Osteopenia, unspecified location  -     DXA Bone Density Axial Skeleton 1 or more sites; Future; Expected date: 11/11/2024    Trigeminal neuralgia  -     DULoxetine (CYMBALTA) 30 MG capsule; Take 1 capsule (30 mg total) by mouth once daily.  Dispense: 30 capsule; Refill: 1    Other specified disorders of bone density and structure, other site  -     DXA Bone Density Axial Skeleton 1 or more sites; Future; Expected date: 11/11/2024    Abnormal finding of blood chemistry, unspecified  -     CBC Auto Differential; Future; Expected date: 11/11/2024  -     Lipid Panel; Future; Expected date: 11/11/2024  -     Hemoglobin A1C; Future; Expected date: 11/11/2024    Other general symptoms and signs  -     TSH; Future; Expected date: 11/11/2024        Return to clinic in 1 year or sooner as needed.    Patient was discussed with Dr. Mendiola.    Robin Reilly MD  Internal Medicine, PGY-3  [facility]

## 2024-11-11 NOTE — PATIENT INSTRUCTIONS
You were seen today for an annual exam and to establish care with me. We discussed your medical history and your current medications. I ordered routine bloodwork to be done today and will follow-up the results of the bloodwork with you either over the Patient Portal on [employer] or via phone. I would like to see you again in 1 year. If referrals, tests, or imaging were ordered for you, they often will call you to schedule, but if you do not hear back in a day or two, please call Central Scheduling at [phone] to schedule any appointments. If you have any questions, please reach out through the Portal or call the clinic at [phone] and leave a message for me (Robin Reilly).

## 2024-11-12 ENCOUNTER — TELEPHONE (OUTPATIENT)
Dept: INTERNAL MEDICINE | Facility: CLINIC | Age: 79
End: 2024-11-12
Payer: MEDICARE

## 2024-11-12 LAB
ESTIMATED AVG GLUCOSE: 97 MG/DL (ref 68–131)
HBA1C MFR BLD: 5 % (ref 4–5.6)

## 2024-11-12 NOTE — TELEPHONE ENCOUNTER
Labs reviewed, largely unremarkable and acceptable. No changes to her management at this time. Called patient and discussed results with her.

## 2024-12-05 ENCOUNTER — TELEPHONE (OUTPATIENT)
Dept: INTERNAL MEDICINE | Facility: CLINIC | Age: 79
End: 2024-12-05
Payer: MEDICARE

## 2024-12-05 NOTE — TELEPHONE ENCOUNTER
----- Message from Christiano sent at 12/5/2024  1:34 PM CST -----  Regarding: Mammogram  Contact: [phone]  Calling to request order for mammogram due to message received. Please contact patient as soon as possible.

## 2024-12-30 ENCOUNTER — TELEPHONE (OUTPATIENT)
Dept: INTERNAL MEDICINE | Facility: CLINIC | Age: 79
End: 2024-12-30

## 2024-12-30 DIAGNOSIS — Z12.31 ENCOUNTER FOR SCREENING MAMMOGRAM FOR MALIGNANT NEOPLASM OF BREAST: Primary | ICD-10-CM

## 2025-01-09 ENCOUNTER — HOSPITAL ENCOUNTER (OUTPATIENT)
Dept: RADIOLOGY | Facility: HOSPITAL | Age: 80
Discharge: HOME | End: 2025-01-09
Payer: MEDICARE

## 2025-01-09 DIAGNOSIS — Z12.31 ENCOUNTER FOR SCREENING MAMMOGRAM FOR MALIGNANT NEOPLASM OF BREAST: ICD-10-CM

## 2025-01-09 PROCEDURE — 77067 SCR MAMMO BI INCL CAD: CPT | Mod: TC

## 2025-01-09 PROCEDURE — 77067 SCR MAMMO BI INCL CAD: CPT | Mod: 26,,, | Performed by: RADIOLOGY

## 2025-01-09 PROCEDURE — 77063 BREAST TOMOSYNTHESIS BI: CPT | Mod: 26,,, | Performed by: RADIOLOGY

## 2025-02-24 DIAGNOSIS — Z00.00 ENCOUNTER FOR MEDICARE ANNUAL WELLNESS EXAM: ICD-10-CM
